# Patient Record
Sex: MALE | Race: WHITE
[De-identification: names, ages, dates, MRNs, and addresses within clinical notes are randomized per-mention and may not be internally consistent; named-entity substitution may affect disease eponyms.]

---

## 2019-02-05 ENCOUNTER — HOSPITAL ENCOUNTER (OUTPATIENT)
Dept: HOSPITAL 25 - OR | Age: 74
Discharge: HOME | End: 2019-02-05
Attending: PLASTIC SURGERY
Payer: MEDICARE

## 2019-02-05 VITALS — SYSTOLIC BLOOD PRESSURE: 134 MMHG | DIASTOLIC BLOOD PRESSURE: 76 MMHG

## 2019-02-05 DIAGNOSIS — Z85.828: ICD-10-CM

## 2019-02-05 DIAGNOSIS — Z85.46: ICD-10-CM

## 2019-02-05 DIAGNOSIS — C44.311: Primary | ICD-10-CM

## 2019-02-05 DIAGNOSIS — M19.90: ICD-10-CM

## 2019-02-05 PROCEDURE — 88332 PATH CONSLTJ SURG EA ADD BLK: CPT

## 2019-02-05 PROCEDURE — 88305 TISSUE EXAM BY PATHOLOGIST: CPT

## 2019-02-05 PROCEDURE — 88331 PATH CONSLTJ SURG 1 BLK 1SPC: CPT

## 2019-08-23 ENCOUNTER — HOSPITAL ENCOUNTER (INPATIENT)
Dept: HOSPITAL 25 - ED | Age: 74
LOS: 4 days | Discharge: NURSING, REHAB OR LONG-TERM CARE | DRG: 869 | End: 2019-08-27
Attending: HOSPITALIST | Admitting: HOSPITALIST
Payer: MEDICARE

## 2019-08-23 DIAGNOSIS — M62.81: ICD-10-CM

## 2019-08-23 DIAGNOSIS — Z83.3: ICD-10-CM

## 2019-08-23 DIAGNOSIS — M47.9: ICD-10-CM

## 2019-08-23 DIAGNOSIS — R25.3: ICD-10-CM

## 2019-08-23 DIAGNOSIS — M79.2: ICD-10-CM

## 2019-08-23 DIAGNOSIS — Z85.46: ICD-10-CM

## 2019-08-23 DIAGNOSIS — M19.042: ICD-10-CM

## 2019-08-23 DIAGNOSIS — A69.22: Primary | ICD-10-CM

## 2019-08-23 DIAGNOSIS — M51.37: ICD-10-CM

## 2019-08-23 DIAGNOSIS — Z72.89: ICD-10-CM

## 2019-08-23 DIAGNOSIS — Z85.828: ICD-10-CM

## 2019-08-23 DIAGNOSIS — Q76.2: ICD-10-CM

## 2019-08-23 DIAGNOSIS — Z80.0: ICD-10-CM

## 2019-08-23 DIAGNOSIS — K59.00: ICD-10-CM

## 2019-08-23 DIAGNOSIS — Z92.3: ICD-10-CM

## 2019-08-23 DIAGNOSIS — Z82.3: ICD-10-CM

## 2019-08-23 DIAGNOSIS — Z83.49: ICD-10-CM

## 2019-08-23 DIAGNOSIS — Z85.07: ICD-10-CM

## 2019-08-23 DIAGNOSIS — Z80.7: ICD-10-CM

## 2019-08-23 LAB
ALBUMIN SERPL BCG-MCNC: 4.1 G/DL (ref 3.2–5.2)
ALBUMIN/GLOB SERPL: 1.4 {RATIO} (ref 1–3)
ALP SERPL-CCNC: 61 U/L (ref 34–104)
ALT SERPL W P-5'-P-CCNC: 20 U/L (ref 7–52)
ANION GAP SERPL CALC-SCNC: 7 MMOL/L (ref 2–11)
AST SERPL-CCNC: 19 U/L (ref 13–39)
BASOPHILS # BLD AUTO: 0.1 10^3/UL (ref 0–0.2)
BUN SERPL-MCNC: 14 MG/DL (ref 6–24)
BUN/CREAT SERPL: 14.7 (ref 8–20)
CALCIUM SERPL-MCNC: 9.1 MG/DL (ref 8.6–10.3)
CHLORIDE SERPL-SCNC: 94 MMOL/L (ref 101–111)
CK SERPL-CCNC: 74 U/L (ref 10–223)
EOSINOPHIL # BLD AUTO: 0.1 10^3/UL (ref 0–0.6)
GLOBULIN SER CALC-MCNC: 2.9 G/DL (ref 2–4)
GLUCOSE SERPL-MCNC: 98 MG/DL (ref 70–100)
HCO3 SERPL-SCNC: 27 MMOL/L (ref 22–32)
HCT VFR BLD AUTO: 46 % (ref 42–52)
HGB BLD-MCNC: 15.8 G/DL (ref 14–18)
INR PPP/BLD: 1.03 (ref 0.82–1.09)
LYMPHOCYTES # BLD AUTO: 1.6 10^3/UL (ref 1–4.8)
MCH RBC QN AUTO: 31 PG (ref 27–31)
MCHC RBC AUTO-ENTMCNC: 34 G/DL (ref 31–36)
MCV RBC AUTO: 91 FL (ref 80–94)
MONOCYTES # BLD AUTO: 1 10^3/UL (ref 0–0.8)
NEUTROPHILS # BLD AUTO: 9.9 10^3/UL (ref 1.5–7.7)
NRBC # BLD AUTO: 0 10^3/UL
NRBC BLD QL AUTO: 0.1
PLATELET # BLD AUTO: 284 10^3/UL (ref 150–450)
POTASSIUM SERPL-SCNC: 3.9 MMOL/L (ref 3.5–5)
PROT SERPL-MCNC: 7 G/DL (ref 6.4–8.9)
RBC # BLD AUTO: 5.06 10^6 /UL (ref 4.18–5.48)
SODIUM SERPL-SCNC: 128 MMOL/L (ref 135–145)
TSH SERPL-ACNC: 0.82 MCIU/ML (ref 0.34–5.6)
WBC # BLD AUTO: 12.7 10^3/UL (ref 3.5–10.8)

## 2019-08-23 PROCEDURE — 85025 COMPLETE CBC W/AUTO DIFF WBC: CPT

## 2019-08-23 PROCEDURE — 95910 NRV CNDJ TEST 7-8 STUDIES: CPT

## 2019-08-23 PROCEDURE — 82607 VITAMIN B-12: CPT

## 2019-08-23 PROCEDURE — 86140 C-REACTIVE PROTEIN: CPT

## 2019-08-23 PROCEDURE — 82550 ASSAY OF CK (CPK): CPT

## 2019-08-23 PROCEDURE — 81003 URINALYSIS AUTO W/O SCOPE: CPT

## 2019-08-23 PROCEDURE — 83970 ASSAY OF PARATHORMONE: CPT

## 2019-08-23 PROCEDURE — 83519 RIA NONANTIBODY: CPT

## 2019-08-23 PROCEDURE — 86618 LYME DISEASE ANTIBODY: CPT

## 2019-08-23 PROCEDURE — 87070 CULTURE OTHR SPECIMN AEROBIC: CPT

## 2019-08-23 PROCEDURE — 86617 LYME DISEASE ANTIBODY: CPT

## 2019-08-23 PROCEDURE — 87205 SMEAR GRAM STAIN: CPT

## 2019-08-23 PROCEDURE — 84155 ASSAY OF PROTEIN SERUM: CPT

## 2019-08-23 PROCEDURE — 82784 ASSAY IGA/IGD/IGG/IGM EACH: CPT

## 2019-08-23 PROCEDURE — 83520 IMMUNOASSAY QUANT NOS NONAB: CPT

## 2019-08-23 PROCEDURE — 82525 ASSAY OF COPPER: CPT

## 2019-08-23 PROCEDURE — 36415 COLL VENOUS BLD VENIPUNCTURE: CPT

## 2019-08-23 PROCEDURE — 84165 PROTEIN E-PHORESIS SERUM: CPT

## 2019-08-23 PROCEDURE — 84157 ASSAY OF PROTEIN OTHER: CPT

## 2019-08-23 PROCEDURE — 82945 GLUCOSE OTHER FLUID: CPT

## 2019-08-23 PROCEDURE — 84425 ASSAY OF VITAMIN B-1: CPT

## 2019-08-23 PROCEDURE — 87389 HIV-1 AG W/HIV-1&-2 AB AG IA: CPT

## 2019-08-23 PROCEDURE — 88112 CYTOPATH CELL ENHANCE TECH: CPT

## 2019-08-23 PROCEDURE — 89051 BODY FLUID CELL COUNT: CPT

## 2019-08-23 PROCEDURE — 94150 VITAL CAPACITY TEST: CPT

## 2019-08-23 PROCEDURE — 86789 WEST NILE VIRUS ANTIBODY: CPT

## 2019-08-23 PROCEDURE — 86666 EHRLICHIA ANTIBODY: CPT

## 2019-08-23 PROCEDURE — 70553 MRI BRAIN STEM W/O & W/DYE: CPT

## 2019-08-23 PROCEDURE — 87529 HSV DNA AMP PROBE: CPT

## 2019-08-23 PROCEDURE — 85610 PROTHROMBIN TIME: CPT

## 2019-08-23 PROCEDURE — A9579 GAD-BASE MR CONTRAST NOS,1ML: HCPCS

## 2019-08-23 PROCEDURE — 86780 TREPONEMA PALLIDUM: CPT

## 2019-08-23 PROCEDURE — 72100 X-RAY EXAM L-S SPINE 2/3 VWS: CPT

## 2019-08-23 PROCEDURE — 72156 MRI NECK SPINE W/O & W/DYE: CPT

## 2019-08-23 PROCEDURE — 62270 DX LMBR SPI PNXR: CPT

## 2019-08-23 PROCEDURE — 86255 FLUORESCENT ANTIBODY SCREEN: CPT

## 2019-08-23 PROCEDURE — 80053 COMPREHEN METABOLIC PANEL: CPT

## 2019-08-23 PROCEDURE — 99284 EMERGENCY DEPT VISIT MOD MDM: CPT

## 2019-08-23 PROCEDURE — 83516 IMMUNOASSAY NONANTIBODY: CPT

## 2019-08-23 PROCEDURE — 80048 BASIC METABOLIC PNL TOTAL CA: CPT

## 2019-08-23 PROCEDURE — 86592 SYPHILIS TEST NON-TREP QUAL: CPT

## 2019-08-23 PROCEDURE — 84630 ASSAY OF ZINC: CPT

## 2019-08-23 PROCEDURE — 72158 MRI LUMBAR SPINE W/O & W/DYE: CPT

## 2019-08-23 PROCEDURE — 84443 ASSAY THYROID STIM HORMONE: CPT

## 2019-08-23 PROCEDURE — 83916 OLIGOCLONAL BANDS: CPT

## 2019-08-23 PROCEDURE — 95886 MUSC TEST DONE W/N TEST COMP: CPT

## 2019-08-23 PROCEDURE — 85652 RBC SED RATE AUTOMATED: CPT

## 2019-08-23 PROCEDURE — 83825 ASSAY OF MERCURY: CPT

## 2019-08-23 PROCEDURE — 86376 MICROSOMAL ANTIBODY EACH: CPT

## 2019-08-23 PROCEDURE — 87798 DETECT AGENT NOS DNA AMP: CPT

## 2019-08-23 PROCEDURE — 72157 MRI CHEST SPINE W/O & W/DYE: CPT

## 2019-08-23 PROCEDURE — 82300 ASSAY OF CADMIUM: CPT

## 2019-08-23 PROCEDURE — 72131 CT LUMBAR SPINE W/O DYE: CPT

## 2019-08-23 PROCEDURE — 82042 OTHER SOURCE ALBUMIN QUAN EA: CPT

## 2019-08-23 PROCEDURE — 82040 ASSAY OF SERUM ALBUMIN: CPT

## 2019-08-23 PROCEDURE — 82175 ASSAY OF ARSENIC: CPT

## 2019-08-23 PROCEDURE — 83655 ASSAY OF LEAD: CPT

## 2019-08-23 PROCEDURE — G0378 HOSPITAL OBSERVATION PER HR: HCPCS

## 2019-08-23 PROCEDURE — 95869 NDL EMG THRC PARASPINAL MUSC: CPT

## 2019-08-23 PROCEDURE — 86788 WEST NILE VIRUS AB IGM: CPT

## 2019-08-23 PROCEDURE — 86753 PROTOZOA ANTIBODY NOS: CPT

## 2019-08-23 PROCEDURE — 86256 FLUORESCENT ANTIBODY TITER: CPT

## 2019-08-23 PROCEDURE — 86038 ANTINUCLEAR ANTIBODIES: CPT

## 2019-08-23 RX ADMIN — HEPARIN SODIUM SCH UNITS: 5000 INJECTION INTRAVENOUS; SUBCUTANEOUS at 21:37

## 2019-08-23 RX ADMIN — THERA TABS SCH TAB: TAB at 21:35

## 2019-08-23 NOTE — XMS REPORT
Summary of Care

 Created on:2019



Patient:Anu Valdez

Sex:Male

:1945

External Reference #:[id number]





Demographics







 Address  [address] [place] [address]

 

 Home Phone  [phone]

 

 Work Phone  [phone]

 

 Mobile Phone  [phone]

 

 Email Address  [email or web address]

 

 Preferred Language  English

 

 Marital Status  Not  or 

 

 Scientology Affiliation  Unknown

 

 Race  White

 

 Ethnic Group  Not  or 









Author







 Organization  [facility]

 

 Address  [address] [address]









Support







 Name  Relationship  Address  Phone

 

 Joseline Valdez  Unavailable  Unavailable  [phone]

 

 Joseline Valdez  Unavailable  Unavailable  [phone]









Care Team Providers







 Name  Role  Phone

 

 Hazel Brito MD  Primary Care Provider  [phone]

 

 Karen Guallpa MD  Unavailable  [phone]









Reason for Referral

Refer to Department Only (Routine)





 Status  Reason  Specialty  Diagnoses /  Referred By  Referred To



       Procedures  Contact  Contact

 

 Authorized    NEUROSURGERY /  Diagnoses



Spondylolisthesis, lumbar region  Zohaib Sotelo,  



     Neurosurgery    MD



  



         [address] [place] [address]



  



         Phone:  



         [phone]



  



         Fax:  



         [phone]  









 Scheduling Instructions

 

 For Pituitary Masses: Refer to Endocinology and Ophthalmology for testing. 
Patients



 already having this testing should have an internal referral to Neurosurgery 
placed.







 







 For Suspected Normal Pressure Hydrocephalus: Refer to neurology for a dementia 
work



 up, have a large volume lumbar puncture (40 cc) performed. For incontinence, 
refer to



 Urology. Obtain Physical Therapy Gait evaluation before and after large volume 
lumbar



 puncture. Patients should have the above work ups performed prior to consulting



 Neurosurgery. 







 







 For Confirmed Normal Pressure Hydrocephalus: Consult Neurosurgery.







 







 









Durable Medical Equipment (Routine)





 Status  Reason  Specialty  Diagnoses /  Referred By  Referred To



       Procedures  Contact  Contact

 

 Pending Review      Diagnoses



Spondylolisthesis at L5-S1 level  Zohaib Sotelo MD



  



         [facility] [id number]



  



         Phone:  



         [phone]



  



         Fax: [phone]  





MRI/CAT/PET Scan (Routine)





 Status  Reason  Specialty  Diagnoses /  Referred By  Referred To



       Procedures  Contact  Contact

 

 Pending Review      Diagnoses



Spondylolisthesis, lumbar region  Zohaib Sotelo MD



  



       



Procedures



MR LUMBAR SPINE WO CONTRAST  [address] [facility] [address]



  



         [place] [address]



  



         Phone:  



         [phone]



  



         Fax: [phone]  









Reason for Visit







 Reason  Comments

 

 Follow Up  1 wk f/u low back pain. Patient here to discuss more options to 
resolve his



   severe low back pain.







Encounter Details







 Date  Type  Department  Care Team  Description

 

 2019  Office Visit  [facility]  Sotelo, Zohaib,  
Spondylolisthesis, lumbar region (Primary Dx);



     - Selene LANGLEY



  Acute hip pain, left;



     [facility]



  [address] [place]  Spondylolisthesis at L5-S1 level



     [address]



  DRIVE



  



     [place], [place] [address] [place] [address] [address]



  



     [phone]  [place] [address]



  



       [phone]  



       (Fax)  







Allergies

No Known Allergiesdocumented as of this encounter (statuses as of 2019)



Medications







 Medication  Sig  Dispensed  Refills  Start Date  End Date  Status

 

 ibuprofen (MOTRIN) 200  Take 400 mg by    0      Active



 MG Oral Tab  mouth EVERY SIX          



   HOURS AS NEEDED          



   for Pain.          

 

 Multiple  Take  by mouth    0      Active



 Vitamins-Minerals  DAILY.          



 (PRESERVISION AREDS            



 PO)            

 

 indomethacin (INDOCIN)  Take 1 Cap by  60 Cap  0  2019    Active



 50 MG Oral Cap  mouth THREE          



   TIMES DAILY.          

 

 predniSONE (DELTASONE)  3 po qd x 3 then  30 Tab  0  2019    Active



 20 MG Oral Tab  2 po qd x3 then          



   i po qd x3 then          



   1/2 po qd x4          

 

 OXYcodone-acetaminophe  Take 1 Tab by  28 Tab  0  2019  
Active



 n (PERCOCET) 5-325 MG  mouth EVERY SIX          



 Oral Tab  HOURS AS NEEDED          



   (back pain) for          



   up to 7 days.          



   Max Daily          



   Amount: 4 Tabs.          



documented as of this encounter (statuses as of 2019)



Active Problems







 Problem  Noted Date

 

 Right foot pain  2018

 

 Dilated aortic root  2017

 

 S/P rotator cuff repair  2016









 Overview: 



right









 Prostate cancer  2014









 Overview: 







 treated with radiation therapy, Dr. Cobb urologist, Dr Raman oncologist:  44 
low



 doses. NO side effects.









 BMI 26.0-26.9,adult  2013









 Overview: 



This patient's BMI has been calculated and is above average, and BMI management 
plan is completed.









 Colon polyp  









 Overview: 







 next c-scop 









 Aortic insufficiency  









 Overview: 







 moderate on routine echo in  while in [place]









 Prostate Cancer  









 Overview: 







 always 2-2.5, was 3.9 in , >9 in ; followed by [facility], 
watchful



 waiting as of , with MRI surveillance, Dr. Muñoz









 Seasonal allergies  

 

 Skin Cancer  









 Overview: 







 SCC in 1980s, BCC 2010 Dr. Costello



documented as of this encounter (statuses as of 2019)



Resolved Problems







 Problem  Noted Date  Resolved Date

 

 Shoulder pain, right  2015

 

 Back pain    2017









 Overview: 







 spina bifida occulta with spondylolisthesis



documented as of this encounter (statuses as of 2019)



Immunizations







 Name  Administration Dates  Next Due

 

 Influenza (IM) Preservative Free  10/01/2016, 2013  

 

 Influenza Vaccine High Dose  10/11/2018, 2017, 2016,  



   2014  

 

 PNEUMOCOCCAL POLYSACCHARIDE VACCINE  2009  

 

 Pneumococcal Conjugate(13 Valent)  2016  

 

 TDAP Vaccine  2016  



documented as of this encounter



Social History







 Tobacco Use  Types  Packs/Day  Years Used  Date

 

 Never Smoker        









 Smokeless Tobacco: Never Used      









 Alcohol Use  Drinks/Week  oz/Week  Comments

 

 Yes  5-7 Standard drinks or equivalent  4.2 - 5.8  









 Social Isolation  Answer  Date Recorded

 

 In a typical week, how many times do you  More than three times a week  2019



 talk on the phone with family, friends, or    



 neighbors?    

 

 How often do you get together with friends  More than three times a week  



 or relatives?    

 

 How often do you attend Zoroastrian or  Not asked  



 Buddhist services?    

 

 Do you belong to any clubs or  Not asked  



 organizations such as Zoroastrian groups,    



 unions, fraternal or athletic groups, or    



 school groups?    

 

 How often do you attend meetings of the  Not asked  



 clubs or organizations you belong to?    

 

 Are you now , , ,    2019



 , never  or living with a    



 partner?    









 Physical Activity  Answer  Date Recorded

 

 On average, how many days per week do you engage in moderate to  3 days  2019



 strenuous exercise (like walking fast, running, jogging,    



 dancing, swimming, biking, or other activities that cause a    



 light or heavy sweat)?    

 

 On average, how many minutes do you engage in exercise at this  60 min  2019



 level?    









 Stress  Answer  Date Recorded

 

 Do you feel stress - tense, restless, nervous, or anxious,  Not at all  2019



 or unable to sleep at night because your mind is troubled    



 all the time - these days?    









 Financial Resource Strain  Answer  Date Recorded

 

 How hard is it for you to pay for the very basics like  Not hard at all  2019



 food, housing, medical care, and heating?    









 Intimate Partner Violence  Answer  Date Recorded

 

 Within the last year, have you been afraid of your partner or  No  2019



 ex-partner?    

 

 Within the last year, have you been humiliated or emotionally  No  2019



 abused in other ways by your partner or ex-partner?    

 

 Within the last year, have you been kicked, hit, slapped, or  No  2019



 otherwise physically hurt by your partner or ex-partner?    

 

 Within the last year, have you been raped or forced to have any  No  2019



 kind of sexual activity by your partner or ex-partner?    









 Food Insecurity  Answer  Date Recorded

 

 Within the past 12 months, you worried that your food would  Never true  2019



 run out before you got money to buy more.    

 

 Within the past 12 months, the food you bought just didn't  Never true  2019



 last and you didn't have money to get more.    









 Transportation Needs  Answer  Date Recorded

 

 In the past 12 months, has lack of transportation kept you from  No  2019



 medical appointments or from getting medications?    

 

 In the past 12 months, has lack of transportation kept you from  No  2019



 meetings, work, or getting things needed for daily living?    









 Sex Assigned at Birth  Date Recorded

 

 Not on file  









 Job Start Date  Occupation  Industry

 

 Not on file  Not on file  Not on file









 Travel History  Travel Start  Travel End









 No recent travel history available.



documented as of this encounter



Last Filed Vital Signs







 Vital Sign  Reading  Time Taken  Comments

 

 Blood Pressure  155/93  2019  7:58 AM EDT  

 

 Pulse  73  2019  7:58 AM EDT  

 

 Temperature  -  -  

 

 Respiratory Rate  -  -  

 

 Oxygen Saturation  -  -  

 

 Inhaled Oxygen Concentration  -  -  

 

 Weight  84.8 kg (187 lb)  2019  7:58 AM EDT  

 

 Height  170.2 cm (5' 7")  2019  7:58 AM EDT  

 

 Body Mass Index  29.29  2019  7:58 AM EDT  



documented in this encounter



Progress Notes

Zohaib Sotelo MD - 2019  8:00 AM EDTFormatting of this note might be 
different from the original.

Name:  Anu Valdez

MRN:  [id number]

:  1945

Date of Service:  2019



Chief Complaint

Patient presents with

 Follow Up

  1 wk f/u low back pain. Patient here to discuss more options to resolve his 
severe low back pain.



No change with prednisone.  Still has severe left-sided low back pain with left 
anterior leg radiation and question of weakness of hip flexion.  Patient also 
has groin pain.  Review of lumbosacral x-rays from last week show significant 
hip degenerative arthritis.  Tylenol not working.  Patient previously had to 
avoid narcotic pain medicine but now, understandably, would like to try it.  He 
has been using a cane because he feels like his leg may give way.  No bowel or 
bladder symptoms.  No saddle anesthesia.  No sciatic symptoms such as posterior 
leg pain.  Patient has history of prostate cancer which has been followed by 
sequential PSAs and thus far there has been no evidence of recurrence.  Patient 
was not able to serve the  due to the spondylolisthesis which was seen 
at that time.  He did not have any symptoms at that time.



Physical exam shows a healthy-appearing man who appears quite uncomfortable due 
to back and leg pain.  Alert and oriented.  Low back nontender.  Hip range of 
motion causes groin pain on internal rotation.  Straight leg raising negative.  
Patient unable to actively flex hip completely though he does have some limited 
flexion.  Grossly no focal motor or sensory abnormalities other than above.



Lumbosacral spine flexion extension views show severe spondylolisthesis.  I do 
not see any motion for certain but it is difficult to tell for sure.  X-rays 
pelvis and hip show severe left hip osteoarthritis.



Impression: Patient appears to have combination of lumbar and primary hip 
arthritis pathology.



Plan: MRI to assess for acute disc versus stenosis versus possibility of 
prostate mets though less likely in light of PSAs and symptoms.  Percocet 
ordered.  Walker ordered.  Neurosurgical consult orderedto be done after 
MRI complete.







  ICD-9-CM ICD-10-CM

1. Spondylolisthesis, lumbar region 738.4 M43.16 MR LUMBAR SPINE WO CONTRAST



Author:  Zohaib Sotelo MD  2019  08:16



This record contains sections created with voice recognition software.  It has 
been electronically signed.  A reasonable attempt at proofreading has been 
made.  Please call with any questions or corrections.

Electronically signed by Zohaib Sotelo MD at 2019  8:43 AM 
EDTdocumented in this encounter



Plan of Treatment







 Date  Type  Specialty  Care Team  Description

 

 2020  Chronic Care Management  Family Practice    









 Name  Type  Priority  Associated Diagnoses  Date/Time

 

 XR HIP 2 VIEWS UNILAT  Imaging  Routine  Spondylolisthesis,  2019  8:33 
AM EDT



 W/PELVIS LEFT      lumbar region



  



       Acute hip pain, left  

 

 XR LUMBAR SPINE  Imaging  Routine  Spondylolisthesis,  2019  8:34 AM EDT



 BENDING VIEWS ONLY      lumbar region  



 MIN 2-3 VIEWS        









 Name  Type  Priority  Associated Diagnoses  Order Schedule

 

 MR LUMBAR SPINE WO  Imaging  Routine  Spondylolisthesis,  Expected: 2019,



 CONTRAST      lumbar region  Expires: 2020

 

 XR HIP 2 VIEWS UNILAT  Imaging  Routine  Spondylolisthesis,  Expected: 2019,



 W/PELVIS LEFT      lumbar region



  Expires: 2020



       Acute hip pain, left  

 

 XR LUMBAR SPINE  Imaging  Routine  Spondylolisthesis,  Expected: 2019,



 BENDING VIEWS ONLY MIN      lumbar region  Expires: 2020



 2-3 VIEWS        









 Name  Type  Priority  Associated Diagnoses  Order Schedule

 

 DME WALKER (AMB)  Referral  Routine  Spondylolisthesis at L5-S1  Ordered:



       level  2019

 

 REFER TO NEUROSURGERY  Referral  Routine  Spondylolisthesis, lumbar  Expected:



       region  2019,



         Expires:



         2020









 Health Maintenance  Due Date  Last Done  Comments

 

 ZOSTER IMMUNIZATION SERIES  1993    



 (1 of 2)      

 

 INFLUENZA VACCINE (#1)  2019  10/11/2018, 2017,  



     10/01/2016, Additional  



     history exists  

 

 MEDICARE ANNUAL WELLNESS  2020, 2016,  



 VISIT    2013, Additional  



     history exists  

 

 DEPRESSION SCREENING  2020  

 

 FALL RISK ASSESSMENT  2020, 2019  

 

 COLONOSCOPY SCREENING  02/15/2023  02/15/2018, 10/05/2015,  



     08/10/2009, Additional  



     history exists  

 

 PNEUMOCOCCAL 65+YRS  Completed  2016, 2009  

 

 HPV IMMUNIZATION SERIES  Aged Out    No longer eligible



       based on patient's age



       to complete this topic

 

 MENINGOCOCCAL VACCINE IMM  Aged Out    No longer eligible



       based on patient's age



       to complete this topic



documented as of this encounter



Implants







 Implanted  Type  Area    Device  Shelf  Model /



         Identifier  Expiration  Serial /



           Date  Lot

 

 5.5 Corks[place]w [place] [id number]    Right:  [employer]      [id number] /



 Implanted: Qty: 1 on 2015 by Sam Rivera MD at [facility] 
   Shoulder         /



             [id number]

 

 Pushlock  3.5mm - [id number]    Right:  AR[employer]REX      [id number] /



 Implanted: Qty: 2 on 2015 by Sam Rivera MD at [facility] 
   Shoulder         /



             [id number]

 

 4.75 Biocomposite Swivel Lock    Right:  ARTHREX    2017  [id number] /



 Implanted: Qty: 1 on 2015 by Sam Rivera MD at [facility] 
   Shoulder         /



             [id number]



documented as of this encounter



Results

Not on filedocumented in this encounter



Visit Diagnoses







 Diagnosis

 

 Spondylolisthesis, lumbar region - Primary

 

 Acute hip pain, left

 

 Spondylolisthesis at L5-S1 level







 Congenital spondylolisthesis



documented in this encounter



Insurance







 Payer  Benefit Plan / Group  Subscriber ID  Effective Dates  Phone  Address  
Type

 

 MEDICARE  MEDICARE PART A & B  xxxxxxxxxxx  2008-Present      Medicare









 Guarantor Name  Account Type  Relation to  Date of Birth  Phone  Billing



     Patient      Address

 

 Anu Valdez  Personal/Family    1945  [phone]  [address] [place] [address]



         (Home)



  [address]







         [phone]  [place], [place]



         (Work)  [id number]



documented as of this encounter

## 2019-08-23 NOTE — XMS REPORT
Summary of Care

 Created on:2019



Patient:Anu Valdez

Sex:Male

:1945

External Reference #:[id number]





Demographics







 Address  [address] [place] [address]

 

 Home Phone  [phone]

 

 Work Phone  [phone]

 

 Mobile Phone  [phone]

 

 Email Address  [email or web address]

 

 Preferred Language  English

 

 Marital Status  Not  or 

 

 Jain Affiliation  Unknown

 

 Race  White

 

 Ethnic Group  Not  or 









Author







 Organization  [facility]

 

 Address  [address] [place] [address]









Support







 Name  Relationship  Address  Phone

 

 Joseline Valdez  Unavailable  Unavailable  [phone]

 

 Joseline Valdez  Unavailable  Unavailable  [phone]









Care Team Providers







 Name  Role  Phone

 

 Hazel Brito MD  Primary Care Provider  [phone]

 

 Karen Guallpa MD  Unavailable  [phone]









Reason for Visit







 Reason  Comments

 

 Back Pain  x 3 weeks, lower back







Encounter Details







 Date  Type  Department  Care Team  Description

 

 2019  Office Visit  Selene Brito,  Weakness of back (
Primary Dx);



     Practice



  Hazel CODY MD



  Breathing problem;



     [address]



  [address] [facility]



  Acute left-sided low back pain with left-sided sciatica



     [place] [address]



  



     [phone] (Fax)  







Allergies

No Known Allergiesdocumented as of this encounter (statuses as of 2019)



Medications







 Medication  Sig  Dispensed  Refills  Start Date  End Date  Status

 

 ibuprofen (MOTRIN)  Take 400 mg    0      Active



 200 MG Oral Tab  by mouth          



   EVERY SIX          



   HOURS AS          



   NEEDED for          



   Pain.          

 

 Multiple  Take  by    0      Active



 Vitamins-Minerals  mouth DAILY.          



 (PRESERVISION            



 AREDS PO)            

 

 indomethacin  Take 1 Cap  60 Cap  0  2019  Discontinued



 (INDOCIN) 50 MG  by mouth        9  (Patient stopped



 Oral Cap  THREE TIMES          the medication)



   DAILY.          

 

 predniSONE  3 po qd x 3  30 Tab  0  2019  Discontinued



 (DELTASONE) 20 MG  then 2 po qd        9  (Therapy



 Oral Tab  x3 then i po          Completed)



   qd x3 then          



   1/2 po qd x4          



documented as of this encounter (statuses as of 2019)



Active Problems







 Problem  Noted Date

 

 Right foot pain  2018

 

 Dilated aortic root  2017

 

 S/P rotator cuff repair  2016









 Overview: 



right









 Prostate cancer  2014









 Overview: 







 treated with radiation therapy, Dr. Cobb urologist, Dr Raman oncologist:  44 
low



 doses. NO side effects.









 BMI 26.0-26.9,adult  2013









 Overview: 



This patient's BMI has been calculated and is above average, and BMI management 
plan is completed.









 Colon polyp  









 Overview: 







 next c-scop 









 Aortic insufficiency  









 Overview: 







 moderate on routine echo in  while in [place]









 Prostate Cancer  









 Overview: 







 always 2-2.5, was 3.9 in , >9 in ; followed by [place] urology, 
watchful



 waiting as of , with MRI surveillance, Dr. Muñoz









 Seasonal allergies  

 

 Skin Cancer  









 Overview: 







 SCC in , BCC 2010 Dr. Costello



documented as of this encounter (statuses as of 2019)



Resolved Problems







 Problem  Noted Date  Resolved Date

 

 Shoulder pain, right  2015

 

 Back pain    2017









 Overview: 







 spina bifida occulta with spondylolisthesis



documented as of this encounter (statuses as of 2019)



Immunizations







 Name  Administration Dates  Next Due

 

 Influenza (IM) Preservative Free  10/01/2016, 2013  

 

 Influenza Vaccine High Dose  10/11/2018, 2017, 2016,  



   2014  

 

 PNEUMOCOCCAL POLYSACCHARIDE VACCINE  2009  

 

 Pneumococcal Conjugate(13 Valent)  2016  

 

 TDAP Vaccine  2016  



documented as of this encounter



Social History







 Tobacco Use  Types  Packs/Day  Years Used  Date

 

 Never Smoker        









 Smokeless Tobacco: Never Used      









 Alcohol Use  Drinks/Week  oz/Week  Comments

 

 Yes  5-7 Standard drinks or equivalent  4.2 - 5.8  









 Social Isolation  Answer  Date Recorded

 

 In a typical week, how many times do you  More than three times a week  2019



 talk on the phone with family, friends, or    



 neighbors?    

 

 How often do you get together with friends  More than three times a week  



 or relatives?    

 

 How often do you attend Sikhism or  Not asked  



 Episcopal services?    

 

 Do you belong to any clubs or  Not asked  



 organizations such as Sikhism groups,    



 unions, fraternal or athletic groups, or    



 school groups?    

 

 How often do you attend meetings of the  Not asked  



 clubs or organizations you belong to?    

 

 Are you now , , ,    2019



 , never  or living with a    



 partner?    









 Physical Activity  Answer  Date Recorded

 

 On average, how many days per week do you engage in moderate to  3 days  2019



 strenuous exercise (like walking fast, running, jogging,    



 dancing, swimming, biking, or other activities that cause a    



 light or heavy sweat)?    

 

 On average, how many minutes do you engage in exercise at this  60 min  2019



 level?    









 Stress  Answer  Date Recorded

 

 Do you feel stress - tense, restless, nervous, or anxious,  Not at all  2019



 or unable to sleep at night because your mind is troubled    



 all the time - these days?    









 Financial Resource Strain  Answer  Date Recorded

 

 How hard is it for you to pay for the very basics like  Not hard at all  2019



 food, housing, medical care, and heating?    









 Intimate Partner Violence  Answer  Date Recorded

 

 Within the last year, have you been afraid of your partner or  No  2019



 ex-partner?    

 

 Within the last year, have you been humiliated or emotionally  No  2019



 abused in other ways by your partner or ex-partner?    

 

 Within the last year, have you been kicked, hit, slapped, or  No  2019



 otherwise physically hurt by your partner or ex-partner?    

 

 Within the last year, have you been raped or forced to have any  No  2019



 kind of sexual activity by your partner or ex-partner?    









 Food Insecurity  Answer  Date Recorded

 

 Within the past 12 months, you worried that your food would  Never true  2019



 run out before you got money to buy more.    

 

 Within the past 12 months, the food you bought just didn't  Never true  2019



 last and you didn't have money to get more.    









 Transportation Needs  Answer  Date Recorded

 

 In the past 12 months, has lack of transportation kept you from  No  2019



 medical appointments or from getting medications?    

 

 In the past 12 months, has lack of transportation kept you from  No  2019



 meetings, work, or getting things needed for daily living?    









 Sex Assigned at Birth  Date Recorded

 

 Not on file  









 Job Start Date  Occupation  Industry

 

 Not on file  Not on file  Not on file









 Travel History  Travel Start  Travel End









 No recent travel history available.



documented as of this encounter



Last Filed Vital Signs







 Vital Sign  Reading  Time Taken  Comments

 

 Blood Pressure  112/86  2019  9:07 AM EDT  

 

 Pulse  87  2019  9:07 AM EDT  

 

 Temperature  -  -  

 

 Respiratory Rate  -  -  

 

 Oxygen Saturation  99%  2019  9:07 AM EDT  

 

 Inhaled Oxygen Concentration  -  -  

 

 Weight  84.8 kg (187 lb)  2019  9:07 AM EDT  

 

 Height  170.2 cm (5' 7")  2019  9:07 AM EDT  

 

 Body Mass Index  29.29  2019  9:07 AM EDT  



documented in this encounter



Patient Instructions

Patient InstructionsHazel Brito MD - 2019  9:00 AM EDTYour 
difficulty coughing and taking a deep breath, abdominal weakness are not from 
your Lumbar spine.

I worry something else is going on.

I worry about a progressive neurologic condition given your inability to lift 
your left leg, weak muscles causing you to fall when  You go to lie down



Please go to the ER now for evaluation.

I want to protect your breathing.Electronically signed by Hazel Brito MD at 2019  9:51 AM EDT

documented in this encounter



Progress Notes

Hazel Brito MD - 2019  9:00 AM EDTFormatting of this note 
might be different from the original.

Nursing Notes:

Essence De León LPN  2019  9:28 AM  Signed

Chief Complaint

Patient presents with

 Back Pain

  x 3 weeks, lower back



Chief Complaint:

Anu Valdez is a 74-y.o. male who presents for back pain for which he is 
seeing Dr Sotelo and Dr Dawson



History of Present Illness/ROS:

Patient twisted at the gym, felt mid back pain.

After a long drive, developed pain down left leg.

He saw Dr Sotelo who ordered an MRI.



Patient has been seeing orthopedics and neurosurgery for back pain

He sent me a message stating he had abdominal and leg weakness, and prior had 
constipation.

I had him see me in an office visit today as a  Result.



 Dr Dawson viewed it and reports:  fusion between L5 and S1 at the side of 
the slip.  I do not appreciate spinal cord or nerve root compression at other 
levels.



Dr Sotelo reports moderate arthritis if hips, as well as back.

Treatment:  Prednisone, then oxycodone which caused constipation.

Stools are now loose, improved with Miralax, and prunes.



Patient has left thigh spasm so leg give up.

He has fallen:  Reports he has weak abdominal muscles that just do not work, 
and has problems with cough.  Cannot produce a cough or take a deep breath.

No  Pain, just not strong enough.

He came in a wheelchair,

He is walking with a walker but has fall

Left leg, thigh, lateral left abdomen,. Left back feel numb, left leg is weak 
in thigh, but not lower leg.



He started Physical Therapy and they feel he has an L4 issue.

However things have changed in the last few days.



Review of Systems - General ROS: positive for  - general weaknss

negative for - chills or fatigue

Respiratory ROS: has difficulty coughing and taking a deep breath

Cardiovascular ROS: negative for - chest pain

Gastrointestinal ROS: positive for - change in bowel habits

Musculoskeletal ROS: low back pain and left leg pain

Neurological ROS: weakness of abdomen/thorax, left leg



XR LUMBAR SPINE BENDING VIEWS ONLY MIN 2-3 VIEWS

Narrative: Procedure(s): XR LUMBAR SPINE BENDING VIEWS ONLY MIN 2-3 VIEWS

Date of service: 2019 8:21 AM



Provided clinical information: 74 years, Male, "Spondylolisthesis

L5-S1"

Procedure and materials: Standard protocol.

Comparison studies: Lumbar spine 2019

Observations:

Lateral flexion, neutral and extension views demonstrate marked

anterior listhesis of L5 unchanged from prior study 2019.



No exaggerated vertebral mobility on lateral flexion-extension views.

Impression: No exaggerated vertebral mobility with attention to the L5-S1 
region.

Urgency: Routine. This is a routine medical imaging report.

Recommendation: No specific imaging recommendation.



Signed by Ricky Ferreira MD  on 2019 11:28 AM



Past Medical History:

Diagnosis Date

 Aortic insufficiency

 moderate on routine echo in  while in [place]

 Back pain

 spina bifida occulta with spondylolisthesis

 BCC (basal cell carcinoma of skin)

 forehead, Dr. Costello 2012, complete excision

 Colon polyp

 tubular adenoma transvers colon 

 Elevated PSA

 always 2-2.5, was 3.9 in 

 Hemorrhoids

 Hx of cardiovascular stress test 2016

 negative for ischemia.  Mildly dilated aortic root with mild aortic 
regurgitation

 Prostate cancer (HCC) 

 treated with radiation therapy, Dr. Cobb urologist, Dr Raman oncologist:  44 
low doses. NO side effects.

 Seasonal allergies

 Skin Cancer

 SCC in , BCC 2010 Dr. Costello

 VHD (valvular heart disease)

 aortic insufficiency



Past Surgical History:

Procedure Laterality Date

 ARTHROSCOPY, SHOULDER  ACROMIOPLASTY, DISTAL CLAVICLE Right 2015

 Procedure: ARTHROSCOPY SHOULDER ACROMIOPLASTY ROTATOR CUFF REPAIR RIGHT;  
Surgeon: Sam Rivera MD;  Location: [facility] MAIN OR

 DESTRUCT OF SKIN LESION

 HEMORRHOIDECTOMY  

 in [facility]

 SHOULDER ARTHROSCOPY Right 12/18/15

 RCR



Current Outpatient Medications:

  ibuprofen (MOTRIN) 200 MG Oral Tab, Take 400 mg by mouth EVERY SIX 
HOURS AS NEEDED for Pain., Disp: , Rfl:

  Multiple Vitamins-Minerals (PRESERVISION AREDS PO), Take  by mouth 
DAILY., Disp: , Rfl:



No Known Allergies



Social History



Socioeconomic History

 Marital status: 

  Spouse name: joseline

 Number of children: 3

 Years of education: Not on file

 Highest education level: Not on file

Occupational History

 Not on file

Social Needs

 Financial resource strain: Not hard at all

 Food insecurity:

  Worry: Never true

  Inability: Never true

 Transportation needs:

  Medical: No

  Non-medical: No

Tobacco Use

 Smoking status: Never Smoker

 Smokeless tobacco: Never Used

Substance and Sexual Activity

 Alcohol use: Yes

  Alcohol/week: 4.2 - 5.8 standard drinks

  Types: 5 - 7 Standard drinks or equivalent per week

 Drug use: No

 Sexual activity: Yes

  Partners: Female

  Comment: 

Lifestyle

 Physical activity:

  Days per week: 3 days

  Minutes per session: 60 min

 Stress: Not at all

Relationships

 Social connections:

  Talks on phone: More than three times a week

  Gets together: More than three times a week

  Attends Episcopal service: Not on file

  Active member of club or organization: Not on file

  Attends meetings of clubs or organizations: Not on file

  Relationship status: 

 Intimate partner violence:

  Fear of current or ex partner: No

  Emotionally abused: No

  Physically abused: No

  Forced sexual activity: No

Other Topics Concern

 Back Care Not Asked

 Bike Helmet Not Asked

 Blood Transfusions Not Asked

 Caffeine Concern Not Asked

 Exercise Yes

  Comment: walking daily with wife

 Hobby Hazards Yes

  Comment: wine making, 

 International Travel Not Asked

  Service No

 Occupational Exposure Not Asked

 Seat Belt Not Asked

 Self-Exams Not Asked

 Sleep Concern No

 Special Diet No

 Stress Concern No

 Weight Concern Yes

  Comment: max weight = 189

Social History Narrative

 Semi-retired from [employer].

 Lives with wife in [place].

 3 daughters.

 He travels overseas for travel consultation about 3 times a year.



Family History

Problem Relation Age of Onset

 Macular Degeneration Mother

 Colon Cancer Mother 55

 Stroke Mother

 Hypertension Mother

 Blood Disease Father

     Polycythemia Vera

 No Known Problems Sister

 Heart Daughter

     atrial fibrillation

 No Known Problems Daughter

 No Known Problems Daughter

 Cancer Paternal Grandfather 78

     colon cancer,  in surgery

 Diabetes Maternal Grandfather



PHYSICAL EXAMINATION:

/86 (BP Location: Right arm, Patient Position: Sitting)  | Pulse 87  | Ht 
5' 7" (1.702 m)  | Wt 187 lb (84.8 kg)  | SpO2 99%  | BMI 29.29 kg/m

Physical Examination:

General appearance - alert, well appearing, appears weak but not indistress.

Mental status - alert, oriented to person, place, and time, normal mood, 
behavior, speech, dress, motor activity, and thought processes

Eyes - pupils equal and reactive, extraocular eye movements grossly intact, 
sclera anicteric

Neck - supple, no cervical or supraclavicular adenopathy, carotids upstroke 
normal bilaterally, no bruits, thyroid exam: thyroid is normal in size without 
nodules or tenderness, no neck masses palpated.

Chest/Lungs - clear to auscultation, no wheezes, rales or rhonchi, symmetric 
air entry, moderate aeration

Heart - normal rate, regular rhythm, normal S1, S2, no murmurs, rubs, clicks or 
gallops

Abdomen - soft, non tender on palpation, nondistended, no masses or 
hepatosplenomegaly, bowel soundsnormal, normal to percussion, no guarding or 
rebound.

No costervertebral angle tenderness

Neurological - alert, oriented, normal speech, he came in a wheelchair an 
needed assistance standingand transferring to the exam table.  Motor is 1-2/5 
left thigh, 3-4/5 left foot, 4/5 RLE, 4-5/5 bilateral UE.  Sensation to soft 
touch, temperature, and vibration are intact in lower extremities and upper 
extremities.

Sensation of abdomen is intact.

DTR 0-1/2 bilateral achilles, patellar tendons, 1/2 bilateral arms.

Babinski is up going left foot, down going right.

He cannot lift his left leg while lying.  He needs assistance to sit up, not 
from pain but from weakness in his thorax.  His cough is weak and he slightly 
chokes on his saliva.  He has difficulty taking a deep breath.

Extremities - dorsalis pedis pulses normal, no pedal edema, no clubbing or 
cyanosis



MRI at [facility] 8/15/19: L4 disc bulge with no stenosis

L5-S1 moderate spinal stenosis and foraminal stenosis

Possible lesion left sacral ala.



XR LUMBAR SPINE BENDING VIEWS ONLY MIN 2-3 VIEWS

Narrative: Procedure(s): XR LUMBAR SPINE BENDING VIEWS ONLY MIN 2-3 VIEWS

Date of service: 2019 8:21 AM



Provided clinical information: 74 years, Male, "Spondylolisthesis

L5-S1"

Procedure and materials: Standard protocol.

Comparison studies: Lumbar spine 2019

Observations:

Lateral flexion, neutral and extension views demonstrate marked

anterior listhesis of L5 unchanged from prior study 2019.



No exaggerated vertebral mobility on lateral flexion-extension views.

Impression: No exaggerated vertebral mobility with attention to the L5-S1 
region.

Urgency: Routine. This is a routine medical imaging report.

Recommendation: No specific imaging recommendation.



Signed by Ricky Ferreira MD  on 2019 11:28 AM



Office Visit on 2019

Component Date Value Ref Range Status

 WBC Count 2019 7.84  4.23 - 9.07 K/uL Final

 RBC Count 2019 4.81  4.30 - 5.89 M/UL Final

 Hemoglobin 2019 14.3  13.7 - 17.5 G/DL Final

 Hematocrit 2019 44.0  40.1 - 51.0 % Final

 MCV 2019 91.5  79.0 - 92.2 FL Final

 MCH 2019 29.7  25.7 - 32.2 PG Final

 MCHC 2019 32.5  32.3 - 36.5 g/dL Final

 Platelet Count 2019 225  163 - 337 K/uL Final

 MPV 2019 11.1  9.4 - 12.4 FL Final

 RDW 2019 13.5  11.6 - 14.4 % Final

 Neutrophil % 2019 64.0  34.0 - 67.9 % Final

 Lymphocyte % 2019 22.3  21.8 - 53.1 % Final

 Monocyte % 2019 10.8  5.3 - 12.2 % Final

 Eosinophil % 2019 2.0  0.8 - 7.0 % Final

 Basophil % 2019 0.6  0.2 - 1.2 % Final

 nRBC % 2019 0.0  0.0 - 0.2 % Final

 Neutrophil # 2019 5.01  1.78 - 5.38 K/UL Final

 Lymphocyte # 2019 1.75  1.32 - 3.57 K/UL Final

 Monocyte # 2019 0.85* 0.30 - 0.82 K/UL Final

 Eosinophil # 2019 0.16  0.04 - 0.54 K/UL Final

 Basophil # 2019 0.05  0.01 - 0.08 K/UL Final

 Immature Gran % 2019 0.3  0.0 - 0.4 % Final

 Immature Gran # 2019 0.02  0.00 - 0.03 K/uL Final

 NRBC # 2019 0.00  0.00 - 0.12 K/uL Final

 Uric Acid 2019 6.6  3.5 - 8.5 MG/DL Final

 C-Reactive Protein 2019 4.70* &lt;1.00 mg/dl Final



Lab Results

Component Value Date

  2019

 K 4.2 2019

  2019

 CO2 29 2019

 GLUCOSE 97 2019

 BUN 17 2019

 CREATININE 1.0 2019

 CALCIUM 8.9 2019

 TP 7.1 2019

 ALBUMIN 3.9 2019

 AST 29 2019

 ALT 26 2019

 ALK 76 2019

 TBILI 0.7 2019

 EGFR &gt;60 2019





ASSESSMENT/PLAN:

  ICD-9-CM ICD-10-CM

1. Weakness of back 724.8 R29.898

2. Breathing problem 786.00 R06.9

3. Acute left-sided low back pain with left-sided sciatica 724.2 M54.42

 724.3



Patient Instructions

Your difficulty coughing and taking a deep breath, abdominal weakness are not 
from your Lumbar spine.

I worry something else is going on.

I worry about a progressive neurologic condition given your inability to lift 
your left leg, weak muscles causing you to fall when  You go to lie down



Please go to the ER now for evaluation.

I want to protect your breathing.



[facility] ER notified of my concerns, spoke with triage nurse Arianna.



Author:  Hazel Brito MD 2019 09:59



Answers for HPI/ROS submitted by the patient on 2019

Back pain

Chronicity: recurrent

Onset: 1 to 4 weeks ago

Frequency: constantly

Progression since onset: gradually worsening

Pain location: lumbar spine

Pain quality: aching

Radiates to: left knee, left thigh

Pain - numeric: 6/10

Pain is: worse during the night

Aggravated by: bending, position, lying down, standing

Stiffness is present: all day

abdominal pain: Yes

bladder incontinence: No

bowel incontinence: Yes

chest pain: No

dysuria: No

fever: No

headaches: No

leg pain: Yes

paresis: Yes

paresthesias: No

pelvic pain: Yes

perianal numbness: No

tingling: No

weakness: Yes

weight loss: Yes

Risk factors: history of cancer, obesity, poor posture, recent trauma

Electronically signed by Hazel Brito MD at 2019 10:08 AM 
EDTdocumented in this encounter



Plan of Treatment







 Date  Type  Specialty  Care Team  Description

 

 2019  Appointment  Neurodiagnostic    

 

 2019  Appointment  Radiology    

 

 2019  Office Visit  Neurosurgery  Ricky Dawson MD



  



       [address] [place] [address]



  



       [phone] (Fax)  

 

 2020  Chronic Care Management  Family Practice    









 Health Maintenance  Due Date  Last Done  Comments

 

 ZOSTER IMMUNIZATION SERIES  1993    



 (1 of 2)      

 

 INFLUENZA VACCINE (#1)  2019  10/11/2018, 2017,  



     2017, Additional  



     history exists  

 

 MEDICARE ANNUAL WELLNESS  2020, 2016,  



 VISIT    2013, Additional  



     history exists  

 

 DEPRESSION SCREENING  2020  

 

 FALL RISK ASSESSMENT  2020, 2019  

 

 COLONOSCOPY SCREENING  02/15/2023  02/15/2018, 10/05/2015,  



     08/10/2009, Additional  



     history exists  

 

 PNEUMOCOCCAL 65+YRS  Completed  2016, 2009  

 

 HPV IMMUNIZATION SERIES  Aged Out    No longer eligible



       based on patient's age



       to complete this topic

 

 MENINGOCOCCAL VACCINE IMM  Aged Out    No longer eligible



       based on patient's age



       to complete this topic



documented as of this encounter



Implants







 Implanted  Type  Area    Device  Shelf  Model /



         Identifier  Expiration  Serial /



           Date  Lot

 

 5.5 Corkscrew [place] [id number]    Right:  [employer]      [id number] /



 Implanted: Qty: 1 on 2015 by Sam Rivera MD at [facility] 
   Shoulder         /



             [id number]

 

 Pushlock  3.5mm - [id number]    Right:  AR[employer]      [id number] /



 Implanted: Qty: 2 on 2015 by Sam Rivera MD at [facility]         /



             [id number]

 

 4.75 Biocomposite Swivel Lock    Right:  ARTHREX    2017  [id number] /



 Implanted: Qty: 1 on 2015 by Sam Rivera MD at [facility]         /



             [id number]



documented as of this encounter



Results

Not on filedocumented in this encounter



Visit Diagnoses







 Diagnosis

 

 Weakness of back - Primary







 Other unspecified back disorder

 

 Breathing problem







 Respiratory abnormality, unspecified

 

 Acute left-sided low back pain with left-sided sciatica



documented in this encounter



Insurance







 Payer  Benefit Plan / Group  Subscriber ID  Effective Dates  Phone  Address  
Type

 

 MEDICARE  MEDICARE PART A & B  xxxxxxxxxxx  2008-Present      Medicare









 Guarantor Name  Account Type  Relation to  Date of Birth  Phone  Billing



     Patient      Address

 

 Anu Valdez  Personal/Family    1945  [phone]  [address] [place] [address]



         (Home)



  [address]







         [phone]  [place], [place]



         (Work)  [id number]



documented as of this encounter

## 2019-08-23 NOTE — ED
Lower Extremity





- HPI Summary


HPI Summary: 





Pt is a 73 y/o M presenting to the ED with a chief complaint of L leg weakness. 

He states he injured his back at the gym on 7/31 to try and strengthen his back 

muscles due to an L5/S1 slip, and ultimately had an MRI done for his sx. Per 

the provider at [facility], the MRI did not show a spinal issue, but it showed a L 

hip issue. He reports decreased ability to walk, decreased ROM in LLE, pain in 

L thigh described as cramping, weakness, occasional numbness in the sole of his 

L heel, inability to cough, and drifting to one side when sitting up. He 

denies tingling, incontinence, or other urinary sx. Pt states he can walk with 

walker.


Medications reviewed. Allergies noted.





- History of Current Complaint


Chief Complaint: EDGeneral


Stated Complaint: UNABLE TO COUGH PER PT


Time Seen by Provider: 08/23/19 11:05


Hx Obtained From: Patient


Mechanism Of Injury: Other - exercising


Onset of Pain: Immediate


Onset/Duration: Still Present


Severity Initially: Mild


Severity Currently: Mild


Pain Intensity: 3


Pain Scale Used: 0-10 Numeric


Timing: Constant, Lasting Weeks


Location: Is Discrete @ - LLE


Associated Signs And Symptoms: Positive: Weakness, Other - L thigh pain


Aggravating Factor(s): Other - sitting up


Alleviating Factor(s): Rest


Able to Bear Weight: Yes - with walker





- Allergies/Home Medications


Allergies/Adverse Reactions: 


 Allergies











Allergy/AdvReac Type Severity Reaction Status Date / Time


 


No Known Allergies Allergy   Verified 08/15/19 11:53














PMH/Surg Hx/FS Hx/Imm Hx


Previously Healthy: Yes


Endocrine/Hematology History: 


   Denies: Hx Diabetes


Cardiovascular History: 


   Denies: Hx Hypertension, Hx Pacemaker/ICD


 History: 


   Denies: Hx Dialysis, Hx Renal Disease


Musculoskeletal History: Reports: Hx Arthritis - LEFT HAND-RING FINGER, Other 

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 

THE [place]


Sensory History: Reports: Hx Contacts or Glasses - GLASSES


   Denies: Hx Hearing Aid


Opthamlomology History: Reports: Hx Contacts or Glasses - GLASSES


Psychiatric History: 


   Denies: Hx Panic Disorder





- Cancer History


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


Hx Chemotherapy: No


Hx Radiation Therapy: Yes





- Surgical History


Surgery Procedure, Year, and Place: HEMORROIDECTOMY.  PROSTATE  - CANCER - 

RADIATION - TREAM[place].  Rt RTC REPAIR


Hx Anesthesia Reactions: No


Infectious Disease History: No


Infectious Disease History: 


   Denies: Traveled Outside the [place] in Last 30 Days





- Social History


Alcohol Use: Daily


Alcohol Amount: GLASS OF WINE DAILY


Hx Substance Use: No


Substance Use Type: Reports: None


Hx Tobacco Use: No


Smoking Status (MU): Never Smoked Tobacco


Have You Smoked in the Last Year: No





Review of Systems


Positive: no symptoms reported.  Negative: incontinence


Positive: Myalgia, Decreased ROM, Other - decreased ability to walk


Positive: Weakness, Numbness - occasional, sole of L heel.  Negative: 

Paresthesia


All Other Systems Reviewed And Are Negative: Yes





Physical Exam





- Summary


Physical Exam Summary: 





Constitutional: Well-developed, Well-nourished, Alert. (-) Distressed. Pt is 

able to cough.


Skin: Warm, Dry


HENT: Normocephalic; Atraumatic


Eyes: Conjunctiva normal


Neck: Musculoskeletal ROM normal neck. (-) JVD, (-) Stridor, (-) Tracheal 

deviation


Cardio: Rhythm regular, rate normal, Heart sounds normal; Intact distal pulses; 

The pedal pulses are 2+ and symmetric. Radial pulses are 2+ and symmetric. (-) 

Murmur


Pulmonary/Chest wall: Effort normal. (-) Respiratory distress, (-) Wheezes, (-) 

Rales


Abd: Soft, (-) tenderness, (-) Distension, (-) Guarding, (-) Rebound


Musculoskeletal: (-) Edema, 3/5 strength in L hip abduction and leg extension. 2

/5 strength in hip inversion and eversion. 5/5 strength in leg sensation. 5/5 

strength in all other major joints.


Lymph: (-) Cervical adenopathy


Neuro: Alert, Oriented x3. Normal skin sensation upon touch.


Psych: Mood and affect Normal





Triage Information Reviewed: Yes


Vital Signs On Initial Exam: 


 Initial Vitals











Temp Pulse Resp BP Pulse Ox


 


 98 F   84   18   126/92   99 


 


 08/23/19 10:54  08/23/19 10:54  08/23/19 10:54  08/23/19 10:54  08/23/19 10:54











Vital Signs Reviewed: Yes





Diagnostics





- Vital Signs


 Vital Signs











  Temp Pulse Resp BP Pulse Ox


 


 08/23/19 10:54  98 F  84  18  126/92  99














- Laboratory


Result Diagrams: 


 08/23/19 11:25





 08/23/19 11:25


Lab Statement: Any lab studies that have been ordered have been reviewed, and 

results considered in the medical decision making process.





- Radiology


  ** Lumbar spine XR


Radiology Interpretation Completed By: Radiologist


Summary of Radiographic Findings: 1.  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 

THERE IS NO SUBLUXATION WITH FLEXION AND EXTENSION. THERE IS MINIMAL EXCURSION 

WITH FLEXION AND EXTENSION.  2.  FACET OSTEOARTHRITIS WITH MILD DEGENERATIVE 

DISC DISEASE.  ED physician has reviewed this report.





- CT


  ** Lumbar spine CT


CT Interpretation Completed By: Radiologist


Summary of CT Findings: 1. GRADE II ANTERIOR SPONDYLOLISTHESIS AT THE L5-S1 

LEVEL UNCHANGED.  2. MODERATE LUMBAR SPONDYLITIC CHANGES AS DESCRIBED.  ED 

physician has reviewed this report.





Lower Extremity Course/Dx





- Course


Course Of Treatment: Patient is here with left lower extremity weakness and 

difficulty coughing.  Patient does have obvious left lower extremity weakness 

on exam with lower back.  Patient had an MRI which was reviewed by 

neurosurgery.  Neurosurgery recommended flexion/extension films and a CT scan.  

Patient had those performed.  Patient had no evidence of any changes from MRI.  

Neurology was consulted and they felt subtle neurologic findings concerning for 

ALS.  Patient is admitted to medicine with neurology on board.





- Diagnoses


Provider Diagnoses: 


 Weakness of left lower extremity, Fasciculations of muscle, Back pain








Discharge ED





- Sign-Out/Discharge


Documenting (check all that apply): Patient Departure





- Discharge Plan


Condition: Stable


Disposition: ADMITTED TO [facility]


Referrals: 


Hazel Brito MD [Primary Care Provider] - 





- Billing Disposition and Condition


Condition: STABLE


Disposition: Admitted to [facility]





- Attestation Statements


Document Initiated by Ed: Yes


Documenting Scribe: Laney Milner


Provider For Whom Ed is Documenting (Include Credential): Bairon Mcfadden MD.


Scribe Attestation: 


Laney CHAVEZ scribed for Bairon Mcfadden MD. on 08/23/19 at 1621. 


Scribe Documentation Reviewed: Yes


Provider Attestation: 


The documentation as recorded by the Laney felix accurately 

reflects the service I personally performed and the decisions made by me, Bairon Mcfadden MD.


Status of Scribe Document: Viewed





Consult


Consult: 





1203 - I spoke with Dr. Zurita who reviewed the MRI and states it does not 

really explain the pt's sx. He recommends a CT of his lower spine.





1213 - I spoke with Dr. Pham about the pt's condition who will be coming to 

evaluate the patient.





1447 - Dr. Pham further examined the patient, and found fasciculations, muscle 

atrophy to the L thigh, and finger abnormalities. He would like the pt to be 

admitted for further testing for ALS.





1452 - Dr. Domingo accepts the pt for admission.

## 2019-08-23 NOTE — XMS REPORT
Summary of Care

 Created on:2019



Patient:Anu Valdez

Sex:Male

:1945

External Reference #:[id number]





Demographics







 Address  [address] [place] [address]

 

 Home Phone  [phone]

 

 Work Phone  [phone]

 

 Mobile Phone  [phone]

 

 Email Address  [email or web address]

 

 Preferred Language  English

 

 Marital Status  Not  or 

 

 Sikh Affiliation  Unknown

 

 Race  White

 

 Ethnic Group  Not  or 









Author







 Organization  [facility]

 

 Address  [address] [address]









Support







 Name  Relationship  Address  Phone

 

 Joseline Valdez  Unavailable  Unavailable  [phone]

 

 Joseline Valdez  Unavailable  Unavailable  [phone]









Care Team Providers







 Name  Role  Phone

 

 Hazel Brito MD  Primary Care Provider  [phone]

 

 Karen Guallpa MD  Unavailable  [phone]









Reason for Referral

MRI/CAT/PET Scan (Routine)





 Status  Reason  Specialty  Diagnoses /  Referred By  Referred To



       Procedures  Contact  Contact

 

 Authorized      Diagnoses



Spinal stenosis of cervical region  [place],  



       



Procedures



MR CERVICAL SPINE WO CONTRAST  MD Ricky



  



         [address] [address]



  



         Phone:  



         [phone]



  



         Fax: [phone]  





Refer to Department Only (Routine)





 Status  Reason  Specialty  Diagnoses /  Referred By  Referred To Contact



       Procedures  Contact  

 

 Authorized    Neurodiagnostic  Diagnoses



Weakness  [place],  [facility]



         Get García MD



  [address]







         [address] [address]







         [place]



  Phone: [phone]



         [address]  



         [address]



  



         Phone:  



         [phone]



  



         Fax:  



         [phone]  









Reason for Visit







 Reason  Comments

 

 New Patient  low back pain/left leg thigh pain



Refer to Department Only (Routine)





 Status  Reason  Specialty  Diagnoses /  Referred By  Referred To



       Procedures  Contact  Contact

 

 Closed    NEUROSURGERY /  Diagnoses



Spondylolisthesis, lumbar region  Zohaib Sotelo,  



     Neurosurgery    MD



  



         [address] [facility] [address]



  



         [place] [address]



  



         Phone:  



         [phone]



  



         Fax: [phone]  









Encounter Details







 Date  Type  Department  Care Team  Description

 

 2019  Office Visit  [facility]



  [place],  Weakness (Primary Dx);



     [address] García MD



  Spondylolisthesis, lumbar region;



     [address] [address]



  [address]



  Spinal stenosis of cervical region



     [phone]  [address] [address] [phone] (Fax)  







Allergies

No Known Allergiesdocumented as of this encounter (statuses as of 2019)



Medications







 Medication  Sig  Dispensed  Refills  Start Date  End Date  Status

 

 ibuprofen (MOTRIN) 200  Take 400 mg by    0      Active



 MG Oral Tab  mouth EVERY SIX          



   HOURS AS NEEDED          



   for Pain.          

 

 Multiple  Take  by mouth    0      Active



 Vitamins-Minerals  DAILY.          



 (PRESERVISION AREDS            



 PO)            

 

 indomethacin (INDOCIN)  Take 1 Cap by  60 Cap  0  2019    Active



 50 MG Oral Cap  mouth THREE          



   TIMES DAILY.          

 

 predniSONE (DELTASONE)  3 po qd x 3 then  30 Tab  0  2019    Active



 20 MG Oral Tab  2 po qd x3 then          



   i po qd x3 then          



   1/2 po qd x4          

 

 OXYcodone-acetaminophe  Take 1 Tab by  28 Tab  0  2019  
Active



 n (PERCOCET) 5-325 MG  mouth EVERY SIX          



 Oral Tab  HOURS AS NEEDED          



   (back pain) for          



   up to 7 days.          



   Max Daily          



   Amount: 4 Tabs.          



documented as of this encounter (statuses as of 2019)



Active Problems







 Problem  Noted Date

 

 Right foot pain  2018

 

 Dilated aortic root  2017

 

 S/P rotator cuff repair  2016









 Overview: 



right









 Prostate cancer  2014









 Overview: 







 treated with radiation therapy, Dr. Cobb urologist, Dr Raman oncologist:  44 
low



 doses. NO side effects.









 BMI 26.0-26.9,adult  2013









 Overview: 



This patient's BMI has been calculated and is above average, and BMI management 
plan is completed.









 Colon polyp  









 Overview: 







 next c-scop 









 Aortic insufficiency  









 Overview: 







 moderate on routine echo in  while in [place]









 Prostate Cancer  









 Overview: 







 always 2-2.5, was 3.9 in , >9 in ; followed by [facility], 
watchful



 waiting as of , with MRI surveillance, Dr. Muñoz









 Seasonal allergies  

 

 Skin Cancer  









 Overview: 







 SCC in , BCC 2010 Dr. Costello



documented as of this encounter (statuses as of 2019)



Resolved Problems







 Problem  Noted Date  Resolved Date

 

 Shoulder pain, right  2015

 

 Back pain    2017









 Overview: 







 spina bifida occulta with spondylolisthesis



documented as of this encounter (statuses as of 2019)



Immunizations







 Name  Administration Dates  Next Due

 

 Influenza (IM) Preservative Free  10/01/2016, 2013  

 

 Influenza Vaccine High Dose  10/11/2018, 2017, 2016,  



   2014  

 

 PNEUMOCOCCAL POLYSACCHARIDE VACCINE  2009  

 

 Pneumococcal Conjugate(13 Valent)  2016  

 

 TDAP Vaccine  2016  



documented as of this encounter



Social History







 Tobacco Use  Types  Packs/Day  Years Used  Date

 

 Never Smoker        









 Smokeless Tobacco: Never Used      









 Alcohol Use  Drinks/Week  oz/Week  Comments

 

 Yes  5-7 Standard drinks or equivalent  4.2 - 5.8  









 Social Isolation  Answer  Date Recorded

 

 In a typical week, how many times do you  More than three times a week  2019



 talk on the phone with family, friends, or    



 neighbors?    

 

 How often do you get together with friends  More than three times a week  



 or relatives?    

 

 How often do you attend Zoroastrian or  Not asked  



 Latter-day services?    

 

 Do you belong to any clubs or  Not asked  



 organizations such as Zoroastrian groups,    



 unions, fraternal or athletic groups, or    



 school groups?    

 

 How often do you attend meetings of the  Not asked  



 clubs or organizations you belong to?    

 

 Are you now , , ,    2019



 , never  or living with a    



 partner?    









 Physical Activity  Answer  Date Recorded

 

 On average, how many days per week do you engage in moderate to  3 days  2019



 strenuous exercise (like walking fast, running, jogging,    



 dancing, swimming, biking, or other activities that cause a    



 light or heavy sweat)?    

 

 On average, how many minutes do you engage in exercise at this  60 min  2019



 level?    









 Stress  Answer  Date Recorded

 

 Do you feel stress - tense, restless, nervous, or anxious,  Not at all  2019



 or unable to sleep at night because your mind is troubled    



 all the time - these days?    









 Financial Resource Strain  Answer  Date Recorded

 

 How hard is it for you to pay for the very basics like  Not hard at all  2019



 food, housing, medical care, and heating?    









 Intimate Partner Violence  Answer  Date Recorded

 

 Within the last year, have you been afraid of your partner or  No  2019



 ex-partner?    

 

 Within the last year, have you been humiliated or emotionally  No  2019



 abused in other ways by your partner or ex-partner?    

 

 Within the last year, have you been kicked, hit, slapped, or  No  2019



 otherwise physically hurt by your partner or ex-partner?    

 

 Within the last year, have you been raped or forced to have any  No  2019



 kind of sexual activity by your partner or ex-partner?    









 Food Insecurity  Answer  Date Recorded

 

 Within the past 12 months, you worried that your food would  Never true  2019



 run out before you got money to buy more.    

 

 Within the past 12 months, the food you bought just didn't  Never true  2019



 last and you didn't have money to get more.    









 Transportation Needs  Answer  Date Recorded

 

 In the past 12 months, has lack of transportation kept you from  No  2019



 medical appointments or from getting medications?    

 

 In the past 12 months, has lack of transportation kept you from  No  2019



 meetings, work, or getting things needed for daily living?    









 Sex Assigned at Birth  Date Recorded

 

 Not on file  









 Job Start Date  Occupation  Industry

 

 Not on file  Not on file  Not on file









 Travel History  Travel Start  Travel End









 No recent travel history available.



documented as of this encounter



Last Filed Vital Signs







 Vital Sign  Reading  Time Taken  Comments

 

 Blood Pressure  140/80  2019  1:42 PM EDT  

 

 Pulse  -  -  

 

 Temperature  -  -  

 

 Respiratory Rate  -  -  

 

 Oxygen Saturation  -  -  

 

 Inhaled Oxygen Concentration  -  -  

 

 Weight  83.9 kg (185 lb)  2019  1:42 PM EDT  

 

 Height  170.2 cm (5' 7")  2019  1:42 PM EDT  

 

 Body Mass Index  28.98  2019  1:42 PM EDT  



documented in this encounter



Progress Notes

Ricky Dawson MD - 2019  1:30 PM EDTFormatting of this note 
might be different from the original.

PATIENT:  Anu Valdez

MRN:  [id number]

:  1945

DATE OF SERVICE:  2019



REFERRING PRACTITIONER:  Zohaib Sotelo

PRIMARY CARE PROVIDER:  Hazel Brito



CHIEF COMPLAINT:

Chief Complaint

Patient presents with

 New Patient

  low back pain/left leg thigh pain



HISTORY OF PRESENT ILLNESS:

Mr. Valdez is a pleasant 74-year-old gentleman who presents to me with a few 
weeks of severe weakness of the left hip flexor.  He also has some chronic 
lower back pain.  He has a chronic L5-S1 spondylolisthesis that has been there 
since he was child.  He really denies any other specific numbness orweakness 
except for some numbness in the left foot.  He denies bowel or bladder 
difficulty.  There has been some pain in the left hip in the groin going down 
to the knee.  There has not been pain belowthe knee.  His recent lumbar MRI 
shows fusion between L5 and S1 at the side of the slip.  I do not appreciate 
spinal cord or nerve root compression at other levels.



Past Medical History:

Diagnosis Date

 Aortic insufficiency

 moderate on routine echo in  while in [place]

 Back pain

 spina bifida occulta with spondylolisthesis

 BCC (basal cell carcinoma of skin)

 forehead, Dr. Costello 2012, complete excision

 Colon polyp

 tubular adenoma transvers colon 

 Elevated PSA

 always 2-2.5, was 3.9 in 

 Hemorrhoids

 Hx of cardiovascular stress test 2016

 negative for ischemia.  Mildly dilated aortic root with mild aortic 
regurgitation

 Prostate cancer (HCC) 

 treated with radiation therapy, Dr. Cobb urologist, Dr Raman oncologist:  44 
low doses. NO side effects.

 Seasonal allergies

 Skin Cancer

 SCC in , BCC 2010 Dr. Costello

 VHD (valvular heart disease)

 aortic insufficiency



Past Surgical History:

Procedure Laterality Date

 ARTHROSCOPY, SHOULDER  ACROMIOPLASTY, DISTAL CLAVICLE Right 2015

 Procedure: ARTHROSCOPY SHOULDER ACROMIOPLASTY ROTATOR CUFF REPAIR RIGHT;  
Surgeon: Sam Rivera MD;  Location: [facility] MAIN OR

 DESTRUCT OF SKIN LESION

 HEMORRHOIDECTOMY  

 in [facility]

 SHOULDER ARTHROSCOPY Right 12/18/15

 RCR



Family History

Problem Relation Age of Onset

 Macular Degeneration Mother

 Colon Cancer Mother 55

 Stroke Mother

 Hypertension Mother

 Blood Disease Father

     Polycythemia Vera

 No Known Problems Sister

 Heart Daughter

     atrial fibrillation

 No Known Problems Daughter

 No Known Problems Daughter

 Cancer Paternal Grandfather 78

     colon cancer,  in surgery

 Diabetes Maternal Grandfather



Social History



Socioeconomic History

 Marital status: 

  Spouse name: joseline

 Number of children: 3

 Years of education: Not on file

 Highest education level: Not on file

Occupational History

 Not on file

Social Needs

 Financial resource strain: Not hard at all

 Food insecurity:

  Worry: Never true

  Inability: Never true

 Transportation needs:

  Medical: No

  Non-medical: No

Tobacco Use

 Smoking status: Never Smoker

 Smokeless tobacco: Never Used

Substance and Sexual Activity

 Alcohol use: Yes

  Alcohol/week: 4.2 - 5.8 standard drinks

  Types: 5 - 7 Standard drinks or equivalent per week

 Drug use: No

 Sexual activity: Yes

  Partners: Female

  Comment: 

Lifestyle

 Physical activity:

  Days per week: 3 days

  Minutes per session: 60 min

 Stress: Not at all

Relationships

 Social connections:

  Talks on phone: More than three times a week

  Gets together: More than three times a week

  Attends Latter-day service: Not on file

  Active member of club or organization: Not on file

  Attends meetings of clubs or organizations: Not on file

  Relationship status: 

 Intimate partner violence:

  Fear of current or ex partner: No

  Emotionally abused: No

  Physically abused: No

  Forced sexual activity: No

Other Topics Concern

 Back Care Not Asked

 Bike Helmet Not Asked

 Blood Transfusions Not Asked

 Caffeine Concern Not Asked

 Exercise Yes

  Comment: walking daily with wife

 Hobby Hazards Yes

  Comment: wine making, 

 International Travel Not Asked

  Service No

 Occupational Exposure Not Asked

 Seat Belt Not Asked

 Self-Exams Not Asked

 Sleep Concern No

 Special Diet No

 Stress Concern No

 Weight Concern Yes

  Comment: max weight = 189

Social History Narrative

 Semi-retired from [employer].

 Lives with wife in [place].

 3 daughters.

 He travels overseas for travel consultation about 3 times a year.



Current Outpatient Medications

Medication

 ibuprofen (MOTRIN) 200 MG Oral Tab

 indomethacin (INDOCIN) 50 MG Oral Cap

 Multiple Vitamins-Minerals (PRESERVISION AREDS PO)

 OXYcodone-acetaminophen (PERCOCET) 5-325 MG Oral Tab

 predniSONE (DELTASONE) 20 MG Oral Tab



No Known Allergies



REVIEW OF SYSTEMS:



Psychological ROS: negative

Ophthalmic ROS: negative

ENT ROS: negative

Hematological and Lymphatic ROS: negative

Endocrine ROS: negative

Respiratory ROS: no cough, shortness of breath, or wheezing

Cardiovascular ROS: no chest pain or dyspnea on exertion

Gastrointestinal ROS: no abdominal pain, change in bowel habits, or black or 
bloody stools

Genito-Urinary ROS: negative

Musculoskeletal ROS: See HPI

Neurological ROS: See HPI



PHYSICAL EXAMINATION:

/80  | Ht 5' 7" (1.702 m)  | Wt 185 lb (83.9 kg)  | BMI 28.98 kg/m  
Body mass index is 28.98 kg/m.

GENERAL:  alert, oriented, no acute distress.

SKIN:  normal, no rashes or abnormalities noted.

HEENT:  conjunctivae are clear,  nasopharynx is with mild edema, pink mucosa, 
and clear secretions, oropharynx is clear.

NECK:  Normal range of motion

LOWER BACK: Normal range of motion

LUNGS:  clear to auscultation bilaterally.

HEART:  Regular rate and rhythm without murmurs or gallops.

ABDOMEN:  Normal bowel sounds without distension

NEUROLOGICAL:



CONSTITUTIONAL: The patient appears to be in no acute distress.



MUSCULOSKELETAL: Station and gait are normal. Motor tone is normal in both 
upper and lower extremities.



Upper Extremity

 Deltoid Tricep Bicep Finger Extension Wrist Extension Intrinsics 

Right 5/5 4/5 5/5 5/5 5/5 4/5 4/5

Left 5/5 5/5 5/5 5/5 5/5 4/5 4/5





Lower Extremity

 Hip Flexion Knee Flexion Knee Extension Dorsi Flexion Plantar Flexion EHL

Right 4/5 5/5 5/5 5/5 5/5 5/5

Left 2/5 5/5 4/5 5/5 5/5 5/5



NEUROLOGICAL: Oriented to time, place, and person. Memory appears to be grossly 
intact. Language function appears to be normal with normal receptive and motor 
speech function.



CRANIAL NERVES:

2nd cranial nerve: Intact

3rd, 4th, and 6th cranial nerves: pupils equal round and reactive to light. 
Full EOMs.

5th cranial nerve: Intact

7th cranial nerve: Intact

8th cranial nerve: Intact

9th, 10th cranial nerves: Intact

11th cranial nerve: Intact.

12th cranial nerve: Intact



SENSATION: Intact throughout to light touch and pin prick.



REFLEXES:



 Biceps BR Triceps Knee Ankle

Right 2+ 2+ 2+ 2+ 2+

Left 2+ 2+ 2+ 0 2+



 Paez's sign: Positive bilaterally                   Babinski: Negative



COORDINATION: Finger-nose-finger examination done well.



IMPRESSION / PLAN:



  ICD-9-CM ICD-10-CM

1. Weakness 780.79 R53.1 REFER TO EMG

2. Spondylolisthesis, lumbar region 738.4 M43.16 REFER TO NEUROSURGERY

3. Spinal stenosis of cervical region 723.0 M48.02 MR CERVICAL SPINE WO CONTRAST



Medical decision making: This gentleman has unexplained weakness of both the 
upper and lower extremities with some hyperreflexia.  The weakness is most 
pronounced in the hip flexor which is less than antigravity.  I do not 
appreciate significant atrophy.  The left knee jerk is absent.  I would like 
toget a cervical MRI to exclude spinal stenosis.  I am also referring him for 
an EMG to see if there are denervation potentials in the left thigh area which 
might indicate neuropathy.  I will see him back after this is done.



Author:  Ricky Dawson MD 2019 14:04Electronically signed by 
Ricky Dawson MD at 2019  2:08 PM EDTdocumented in this encounter



Plan of Treatment







 Date  Type  Specialty  Care Team  Description

 

 2020  Chronic Care Management  Family Practice    









 Name  Type  Priority  Associated Diagnoses  Order Schedule

 

 MR CERVICAL SPINE WO  Imaging  Routine  Spinal stenosis of  Expected: 2019,



 CONTRAST      cervical region  Expires: 2020









 Name  Type  Priority  Associated Diagnoses  Order Schedule

 

 REFER TO EMG  Referral  Routine  Weakness  Expected: 2019, Expires:



         2020









 Health Maintenance  Due Date  Last Done  Comments

 

 ZOSTER IMMUNIZATION SERIES  1993    



 (1 of 2)      

 

 INFLUENZA VACCINE (#1)  2019  10/11/2018, 2017,  



     2017, Additional  



     history exists  

 

 MEDICARE ANNUAL WELLNESS  2020, 2016,  



 VISIT    2013, Additional  



     history exists  

 

 DEPRESSION SCREENING  2020  

 

 FALL RISK ASSESSMENT  2020, 2019  

 

 COLONOSCOPY SCREENING  02/15/2023  02/15/2018, 10/05/2015,  



     08/10/2009, Additional  



     history exists  

 

 PNEUMOCOCCAL 65+YRS  Completed  2016, 2009  

 

 HPV IMMUNIZATION SERIES  Aged Out    No longer eligible



       based on patient's age



       to complete this topic

 

 MENINGOCOCCAL VACCINE IMM  Aged Out    No longer eligible



       based on patient's age



       to complete this topic



documented as of this encounter



Implants







 Implanted  Type  Area    Device  Shelf  Model /



         Identifier  Expiration  Serial /



           Date  Lot

 

 5.5 Corkscrew [place] [id number]    Right:  [employer]      [id number] /



 Implanted: Qty: 1 on 2015 by Sam Rivera MD at [facility] 
   Shoulder         /



             [id number]

 

 Pushlock  3.5mm - [id number]    Right:  ARTHREX      [id number] /



 Implanted: Qty: 2 on 2015 by Sam Rivera MD at [facility] 
   Shoulder         /



             [id number]

 

 4.75 Biocomposite Swivel Lock    Right:  ARTHREX    2017  [id number] /



 Implanted: Qty: 1 on 2015 by Sam Rivera MD at [facility] 
   Shoulder         /



             [id number]



documented as of this encounter



Results

Not on filedocumented in this encounter



Visit Diagnoses







 Diagnosis

 

 Weakness - Primary







 Other malaise and fatigue

 

 Spondylolisthesis, lumbar region

 

 Spinal stenosis of cervical region







 Spinal stenosis in cervical region



documented in this encounter



Insurance







 Payer  Benefit Plan / Group  Subscriber ID  Effective Dates  Phone  Address  
Type

 

 MEDICARE  MEDICARE PART A & B  xxxxxxxxxxx  2008-Present      Medicare









 Guarantor Name  Account Type  Relation to  Date of Birth  Phone  Billing



     Patient      Address

 

 Anu Valdez  Personal/Family    1945  [phone]  [address] [place] [address]



         (Home)



  [address]







         [phone]  [place], [place]



         (Work)  [id number]



documented as of this encounter

## 2019-08-23 NOTE — XMS REPORT
Summary of Care

 Created on:2019



Patient:Anu Valdez

Sex:Male

:1945

External Reference #:[id number]





Demographics







 Address  [address] [place] [address]

 

 Home Phone  [phone]

 

 Work Phone  [phone]

 

 Mobile Phone  [phone]

 

 Email Address  [email or web address]

 

 Preferred Language  English

 

 Marital Status  Not  or 

 

 Confucianism Affiliation  Unknown

 

 Race  White

 

 Ethnic Group  Not  or 









Author







 Organization  [facility]

 

 Address  [address] [place] [address]









Support







 Name  Relationship  Address  Phone

 

 Joseline Valdez  Unavailable  Unavailable  [phone]

 

 Joseline Valdez  Unavailable  Unavailable  [phone]









Care Team Providers







 Name  Role  Phone

 

 Hazel Brito MD  Primary Care Provider  [phone]

 

 Karen Guallpa MD  Unavailable  [phone]









Reason for Visit







 Reason  Comments

 

 New Patient  Low back pain. DOI: 19 Patient twisted his lower back last 
Wednesday



   working out to loosen a stiff back. Pain radiates down left leg.







Encounter Details







 Date  Type  Department  Care Team  Description

 

 2019  Office Visit  [facility] -  Zohaib Sotelo MD



  Left sided sciatica



     [address]



  (Primary Dx)



     [address] [facility]



  [address]



  



     [address]



  [place], [place] [address] [place] [address]



  [phone] (Fax)  







Allergies

No Known Allergiesdocumented as of this encounter (statuses as of 2019)



Medications







 Medication  Sig  Dispensed  Refills  Start Date  End Date  Status

 

 ibuprofen (MOTRIN) 200  Take 400 mg by    0      Active



 MG Oral Tab  mouth EVERY SIX          



   HOURS AS NEEDED          



   for Pain.          

 

 Multiple  Take  by mouth    0      Active



 Vitamins-Minerals  DAILY.          



 (PRESERVISION AREDS PO)            

 

 indomethacin (INDOCIN)  Take 1 Cap by  60 Cap  0  2019    Active



 50 MG Oral Cap  mouth THREE TIMES          



   DAILY.          

 

 predniSONE (DELTASONE)  3 po qd x 3 then  30 Tab  0  2019    Active



 20 MG Oral Tab  2 po qd x3 then i          



   po qd x3 then 1/2          



   po qd x4          



documented as of this encounter (statuses as of 2019)



Active Problems







 Problem  Noted Date

 

 Right foot pain  2018

 

 Dilated aortic root  2017

 

 S/P rotator cuff repair  2016









 Overview: 



right









 Prostate cancer  2014









 Overview: 







 treated with radiation therapy, Dr. Cobb urologist, Dr Raman oncologist:  44 
low



 doses. NO side effects.









 BMI 26.0-26.9,adult  2013









 Overview: 



This patient's BMI has been calculated and is above average, and BMI management 
plan is completed.









 Colon polyp  









 Overview: 







 next c-scop 









 Aortic insufficiency  









 Overview: 







 moderate on routine echo in  while in [place]









 Prostate Cancer  









 Overview: 







 always 2-2.5, was 3.9 in , >9 in ; followed by [facility], 
watchful



 waiting as of , with MRI surveillance, Dr. Mñuoz









 Seasonal allergies  

 

 Skin Cancer  









 Overview: 







 SCC in , BCC 2010 Dr. Costello



documented as of this encounter (statuses as of 2019)



Resolved Problems







 Problem  Noted Date  Resolved Date

 

 Shoulder pain, right  2015

 

 Back pain    2017









 Overview: 







 spina bifida occulta with spondylolisthesis



documented as of this encounter (statuses as of 2019)



Immunizations







 Name  Administration Dates  Next Due

 

 Influenza (IM) Preservative Free  10/01/2016, 2013  

 

 Influenza Vaccine High Dose  10/11/2018, 2017, 2016,  



   2014  

 

 PNEUMOCOCCAL POLYSACCHARIDE VACCINE  2009  

 

 Pneumococcal Conjugate(13 Valent)  2016  

 

 TDAP Vaccine  2016  



documented as of this encounter



Social History







 Tobacco Use  Types  Packs/Day  Years Used  Date

 

 Never Smoker        









 Smokeless Tobacco: Never Used      









 Alcohol Use  Drinks/Week  oz/Week  Comments

 

 Yes  5-7 Standard drinks or equivalent  4.2 - 5.8  









 Social Isolation  Answer  Date Recorded

 

 In a typical week, how many times do you  More than three times a week  2019



 talk on the phone with family, friends, or    



 neighbors?    

 

 How often do you get together with friends  More than three times a week  



 or relatives?    

 

 How often do you attend [employer] or  Not asked  



 Yazidi services?    

 

 Do you belong to any clubs or  Not asked  



 organizations such as Baptist groups,    



 unions, fraternal or athletic groups, or    



 school groups?    

 

 How often do you attend meetings of the  Not asked  



 clubs or organizations you belong to?    

 

 Are you now , , ,    2019



 , never  or living with a    



 partner?    









 Physical Activity  Answer  Date Recorded

 

 On average, how many days per week do you engage in moderate to  3 days  2019



 strenuous exercise (like walking fast, running, jogging,    



 dancing, swimming, biking, or other activities that cause a    



 light or heavy sweat)?    

 

 On average, how many minutes do you engage in exercise at this  60 min  2019



 level?    









 Stress  Answer  Date Recorded

 

 Do you feel stress - tense, restless, nervous, or anxious,  Not at all  2019



 or unable to sleep at night because your mind is troubled    



 all the time - these days?    









 Financial Resource Strain  Answer  Date Recorded

 

 How hard is it for you to pay for the very basics like  Not hard at all  2019



 food, housing, medical care, and heating?    









 Intimate Partner Violence  Answer  Date Recorded

 

 Within the last year, have you been afraid of your partner or  No  2019



 ex-partner?    

 

 Within the last year, have you been humiliated or emotionally  No  2019



 abused in other ways by your partner or ex-partner?    

 

 Within the last year, have you been kicked, hit, slapped, or  No  2019



 otherwise physically hurt by your partner or ex-partner?    

 

 Within the last year, have you been raped or forced to have any  No  2019



 kind of sexual activity by your partner or ex-partner?    









 Food Insecurity  Answer  Date Recorded

 

 Within the past 12 months, you worried that your food would  Never true  2019



 run out before you got money to buy more.    

 

 Within the past 12 months, the food you bought just didn't  Never true  2019



 last and you didn't have money to get more.    









 Transportation Needs  Answer  Date Recorded

 

 In the past 12 months, has lack of transportation kept you from  No  2019



 medical appointments or from getting medications?    

 

 In the past 12 months, has lack of transportation kept you from  No  2019



 meetings, work, or getting things needed for daily living?    









 Sex Assigned at Birth  Date Recorded

 

 Not on file  









 Job Start Date  Occupation  Industry

 

 Not on file  Not on file  Not on file









 Travel History  Travel Start  Travel End









 No recent travel history available.



documented as of this encounter



Last Filed Vital Signs







 Vital Sign  Reading  Time Taken  Comments

 

 Blood Pressure  146/90  2019  8:22 AM EDT  

 

 Pulse  72  2019  8:22 AM EDT  

 

 Temperature  -  -  

 

 Respiratory Rate  -  -  

 

 Oxygen Saturation  -  -  

 

 Inhaled Oxygen Concentration  -  -  

 

 Weight  84.8 kg (187 lb)  2019  8:22 AM EDT  

 

 Height  170.2 cm (5' 7")  2019  8:22 AM EDT  

 

 Body Mass Index  29.29  2019  8:22 AM EDT  



documented in this encounter



Progress Notes

Zohaib Sotelo MD - 2019  8:30 AM EDTFormatting of this note might be 
different from the original.

Name:  Anu Valdez

MRN:  [id number]

:  1945

Date of Service:  2019



Referring Provider:  Self

Primary Care Provider:  Hazel Brito



Chief Complaint

Patient presents with

 New Patient

  Low back pain. DOI: 19 Patient twisted his lower back last Wednesday 
working out to loosen a stiff back. Pain radiates down left leg.



Past Medical History:

Diagnosis Date

 Aortic insufficiency

 moderate on routine echo in  while in [place]

 Back pain

 spina bifida occulta with spondylolisthesis

 BCC (basal cell carcinoma of skin)

 forehead, Dr. Costello 2012, complete excision

 Colon polyp

 tubular adenoma transvers colon 

 Elevated PSA

 always 2-2.5, was 3.9 in 

 Hemorrhoids

 Hx of cardiovascular stress test 2016

 negative for ischemia.  Mildly dilated aortic root with mild aortic 
regurgitation

 Prostate cancer (HCC) 

 treated with radiation therapy, Dr. Cobb urologist, Dr Raman oncologist:  44 
low doses. NO side effects.

 Seasonal allergies

 Skin Cancer

 SCC in , BCC 2010 Dr. Costello

 VHD (valvular heart disease)

 aortic insufficiency



Past Surgical History:

Procedure Laterality Date

 ARTHROSCOPY, SHOULDER  ACROMIOPLASTY, DISTAL CLAVICLE Right 2015

 Procedure: ARTHROSCOPY SHOULDER ACROMIOPLASTY ROTATOR CUFF REPAIR RIGHT;  
Surgeon: Sam Rivera MD;  Location: [facility] MAIN OR

 DESTRUCT OF SKIN LESION

 HEMORRHOIDECTOMY  

 in [facility]

 SHOULDER ARTHROSCOPY Right 12/18/15

 RCR



Current Outpatient Medications

Medication Sig

 ibuprofen (MOTRIN) 200 MG Oral Tab Take 400 mg by mouth EVERY SIX HOURS 
AS NEEDED for Pain.

 indomethacin (INDOCIN) 50 MG Oral Cap Take 1 Cap by mouth THREE TIMES 
DAILY.

 Multiple Vitamins-Minerals (PRESERVISION AREDS PO) Take  by mouth DAILY.

 predniSONE (DELTASONE) 20 MG Oral Tab 3 po qd x 3 then 2 po qd x3 then i 
po qd x3 then 1/2 poqd x4



No current facility-administered medications for this visit.



No Known Allergies

Social History



Socioeconomic History

 Marital status: 

  Spouse name: joseline

 Number of children: 3

 Years of education: Not on file

 Highest education level: Not on file

Occupational History

 Not on file

Social Needs

 Financial resource strain: Not hard at all

 Food insecurity:

  Worry: Never true

  Inability: Never true

 Transportation needs:

  Medical: No

  Non-medical: No

Tobacco Use

 Smoking status: Never Smoker

 Smokeless tobacco: Never Used

Substance and Sexual Activity

 Alcohol use: Yes

  Alcohol/week: 4.2 - 5.8 standard drinks

  Types: 5 - 7 Standard drinks or equivalent per week

 Drug use: No

 Sexual activity: Yes

  Partners: Female

  Comment: 

Lifestyle

 Physical activity:

  Days per week: 3 days

  Minutes per session: 60 min

 Stress: Not at all

Relationships

 Social connections:

  Talks on phone: More than three times a week

  Gets together: More than three times a week

  Attends Yazidi service: Not on file

  Active member of club or organization: Not on file

  Attends meetings of clubs or organizations: Not on file

  Relationship status: 

 Intimate partner violence:

  Fear of current or ex partner: No

  Emotionally abused: No

  Physically abused: No

  Forced sexual activity: No

Other Topics Concern

 Back Care Not Asked

 Bike Helmet Not Asked

 Blood Transfusions Not Asked

 Caffeine Concern Not Asked

 Exercise Yes

  Comment: walking daily with wife

 Hobby Hazards Yes

  Comment: wine making, 

 International Travel Not Asked

  Service No

 Occupational Exposure Not Asked

 Seat Belt Not Asked

 Self-Exams Not Asked

 Sleep Concern No

 Special Diet No

 Stress Concern No

 Weight Concern Yes

  Comment: max weight = 189

Social History Narrative

 Semi-retired from [employer].

 Lives with wife in [place].

 3 daughters.

 He travels overseas for travel consultation about 3 times a year.



Family History

Problem Relation Age of Onset

 Macular Degeneration Mother

 Colon Cancer Mother 55

 Stroke Mother

 Hypertension Mother

 Blood Disease Father

     Polycythemia Vera

 No Known Problems Sister

 Heart Daughter

     atrial fibrillation

 No Known Problems Daughter

 No Known Problems Daughter

 Cancer Paternal Grandfather 78

     colon cancer,  in surgery

 Diabetes Maternal Grandfather



ROS:  Review of systems intake completed by clinical staff.  I have reviewed 
and agree with their documentation.



24-year-old active man presents with 1 week history of severe left-sided low 
back pain with anteriorlateral radiation into the proximal leg.  Was working 
out on an exercise machine and the next day experience the pain.  No help from 
anti-inflammatories.  Took a long car ride to [place] at the [facility] last 
week.  Was told in the 1960s that he had at least a 50% spondylolisthesis which 
was felt to probably be congenital.  Generally does not have symptoms.



Physical exam shows a healthy-appearing man in no acute distress.  Alert and 
oriented.  Antalgic gait.  Spine range of motion decreased.  Straight leg 
raising negative.  No sensory abnormalities.  Question of weakness to hip 
flexion but may be secondary to pain.  Rest of left lower extremity exam normal.



X-rays of the lumbosacral spine show spondylosis and a grade 3/4 
spondylolisthesis at L5-S1 which appears to be chronic.



Impression: Acute left radiculopathy.



Plan: Sliding dose of prednisone.  Reevaluate in 1 week.







Impression:

No diagnosis found.



Author:  Zohaib Sotelo MD  2019  08:51



This record contains sections created with voice recognition software.  It has 
been electronically signed.  A reasonable attempt at proofreading has been 
made.  Please call with any questions or corrections.



Electronically signed by Zohaib Sotelo MD at 2019  8:55 AM 
EDTdocumented in this encounter



Plan of Treatment







 Date  Type  Specialty  Care Team  Description

 

 2020  Chronic Care Management  Family Practice    









 Health Maintenance  Due Date  Last Done  Comments

 

 ZOSTER IMMUNIZATION SERIES  1993    



 (1 of 2)      

 

 INFLUENZA VACCINE (#1)  2019  10/11/2018, 2017,  



     10/01/2016, Additional  



     history exists  

 

 MEDICARE ANNUAL WELLNESS  2020, 2016,  



 VISIT    2013, Additional  



     history exists  

 

 DEPRESSION SCREENING  2020  

 

 FALL RISK ASSESSMENT  2020, 2019  

 

 COLONOSCOPY SCREENING  02/15/2023  02/15/2018, 10/05/2015,  



     08/10/2009, Additional  



     history exists  

 

 PNEUMOCOCCAL 65+YRS  Completed  2016, 2009  

 

 HPV IMMUNIZATION SERIES  Aged Out    No longer eligible



       based on patient's age



       to complete this topic

 

 MENINGOCOCCAL VACCINE IMM  Aged Out    No longer eligible



       based on patient's age



       to complete this topic



documented as of this encounter



Implants







 Implanted  Type  Area    Device  Shelf  Model /



         Identifier  Expiration  Serial /



           Date  Lot

 

 5.5 Corkscrew [place] [id number]    Right:  ARTHREX      [id number] /



 Implanted: Qty: 1 on 2015 by Sam Rivera MD at [facility] 
   Shoulder         /



             [id number]

 

 Pushlock  3.5mm - [id number]    Right:  ARTHREX      [id number] /



 Implanted: Qty: 2 on 2015 by Sam Rivera MD at [facility]         /



             [id number]

 

 4.75 Biocomposite Swivel Lock    Right:  ARTHREX    2017  [id number] /



 Implanted: Qty: 1 on 2015 by Sam Rivera MD at [facility]         /



             [id number]



documented as of this encounter



Results

Not on filedocumented in this encounter



Visit Diagnoses







 Diagnosis

 

 Left sided sciatica - Primary







 Sciatica



documented in this encounter



Insurance







 Payer  Benefit Plan / Group  Subscriber ID  Effective Dates  Phone  Address  
Type

 

 MEDICARE  MEDICARE PART A & B  xxxxxxxxxxx  2008-Present      Medicare









 Guarantor Name  Account Type  Relation to  Date of Birth  Phone  Billing



     Patient      Address

 

 Anu Valdez  Personal/Family    1945  [phone]  [address] [place] [address]



         (Home)



  [address]







         [phone]  [place], [place]



         (Work)  [id number]



documented as of this encounter

## 2019-08-23 NOTE — HP
CC:  Dr. Brito *

 

HISTORY AND PHYSICAL:

 

DATE OF ADMISSION:  19

 

PRIMARY CARE PROVIDER:  Dr. Brito

 

OTHER PROVIDERS:  Dr. Pham, Neurology.

 

ATTENDING PHYSICIAN:  Dr. Elvie Martinez * dictated by Earl Woodard NP).

 

CHIEF COMPLAINT:

1.  Left leg weakness.

2.  Inability to cough.

 

HISTORY OF PRESENT ILLNESS:  Mr. Valdez is a 74-year-old male with a past 
medical history significant for arthritis, L5-S1 congenital issue, prostate 
cancer who presented to the emergency department today with complaints of left 
leg weakness.  The patient reports he has been having issues with his left leg 
since approximately 19.  He reports on 19 he was at the gym and he 
felt a "pinch" in his back and started having back pain and less control of his 
left thigh.  He reports he was seen by his primary care provider for these 
symptoms and was provided with pain medications and had imaging which had no 
significant findings.  He reports that today he decided to present to the 
emergency room as he could not cough, and when he told his primary care this, 
they became worried.  In addition, the control of his left thigh has become 
worse.  While in the emergency department, the patient had a lumbar CT which 
revealed grade 2 anterior spondylolisthesis at the level of L5-S1 level 
unchanged and moderate lumbar spondylitic changes.  The patient also had lumbar 
spine x-ray that revealed grade 2 anterolisthesis of L5 and S1 with no 
subluxation, minimal exertion with flexion and extension, osteoarthritis, mild 
degenerative disk disease.  He also had CBC which revealed a slightly elevated 
WBC at 12.7.  He had a CMP with a  low sodium at 128 and a slightly elevated 
CRP at 13.98.  Given the concern of his inability to cough, a new onset 
inability to control left thigh, Dr. Pham for Neurology was consulted and 
hospitalist team was consulted for admission.

 

PAST MEDICAL HISTORY:

1.  Arthritis.

2.  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

3.  Pancreatic cancer, history of.

 

PAST SURGICAL HISTORY:

1.  Hemorrhoidectomy.

2.  Right rotator cuff repair.

 

HOME MEDICATIONS:

1.  PreserVision AREDS 2 soft gels b.i.d.

2.  Vitamin D3 1000 units p.o. q. a.m.

3.  MiraLAX b.i.d. p.r.n.

 

ALLERGIES:  No known drug allergies.

 

FAMILY HISTORY:  Mother  at age 101 after a stroke.  Father  
due to polycythemia vera in his 80s.  Father carried a history of high 
cholesterol.  No known history of CAD in the family.  The patient reports his 
grandfather had diabetes.  The patient reports mother also carried a history of 
colon cancer.

 

SOCIAL HISTORY:  The patient does not smoke.  The patient does not use drugs.  
The patient reports 1 glass of wine daily.  The patient is retired.  The 
patient lives with his wife.  Before 19, the patient was independent with 
ADLs; after 19, the patient has been using a cane and has progressed to a 
walker over the past month.  The patient's surrogate decision maker will be his 
wife, Joseline Valdez, [phone], in the event he cannot make decisions for 
himself.

 

REVIEW OF SYSTEMS:  The patient reports back pain near bilateral hips, which he 
currently rates at 0 as he is lying still, but if he were to move it would be a 
5 to 6.  The patient reports significant weakness in the left upper thigh as he 
is unable to raise his left upper leg or control that thigh.  The patient 
reports inability to cough.  The patient reports numbness and tingling in his 
left heel. The patient denies loss of bowel or bladder.  The patient denies 
fever, anorexia, chest pain, edema, shortness of breath, nausea, vomiting, 
diarrhea, abdominal pain, dysuria, visual complaints, difficulty swallowing, 
rashes or lesions, anxiety or depression.

 

                               PHYSICAL EXAMINATION

 

GENERAL:  Mr. Valdez is a 74-year-old male who is lying in the bed on his 
left side.  He appears to be in no acute distress.  He appears stated age.

 

VITAL SIGNS:  Temp 98, HR 78, RR 15, O2 saturation 97% on room air, /57.

 

HEENT:  EOMs intact.  PERRLA.  Sclerae without icterus.  Oral mucosa is moist 
without lesion.  Posterior pharynx is clear.

 

NECK:  No lymphadenopathy.  Supple.

 

RESPIRATORY:  Symmetrical chest expansion.  No accessory muscle use.  Lungs are 
clear to auscultation.  No wheezes, rhonchi, or rales.

 

CARDIOVASCULAR:  Regular rate and rhythm.  S1, S2 present.  No murmurs, rubs, 
or gallops.

 

ABDOMEN:  Soft, nontender.  Bowel sounds are normoactive.

 

EXTREMITIES:  Skin is warm and smooth bilaterally.  No edema.  No clubbing or 
cyanosis.

 

MUSCULOSKELETAL:  The patient reports pain in lower back.

 

NEURO:  The patient is awake, alert, and oriented x4.  Cranial nerves are II 
through XII are grossly intact.  Motor strength is 5/5 in upper extremities.  
Motor strength is 5/5 in ankles.  Strength is intact in right upper leg.  The 
patient is unable to participate in strength assessment of left upper leg.

 

SKIN:  Grossly intact.

 

 DIAGNOSTIC STUDIES/LAB DATA:  WBC 12.7, hemoglobin 15.8, hematocrit 46, 
platelets 284.  Sodium 128, potassium 3.9, chloride 94, carbon dioxide 27, BUN 
14, creatinine 0.95.  B12 of 424.  TSH 0.82.

 

Imaging as above in HPI.

 

ASSESSMENT AND PLAN:  Mr. Valdez is a 74-year-old male with a past medical 
history significant for arthritis, congenital L5-S1 issue, prostate cancer who 
presented to the emergency department with decreased motor movement in left 
thigh and inability to produce cough.  The patient will be placed OBV.

 

1.  Neurological deficit, left thigh:  As mentioned above, the patient had 
imaging which was fairly unremarkable.  The patient was seen by Dr. Pham here 
in the emergency department, who has ordered further imaging including the MRI 
and EMG. Dr. Pham has also ordered labs.  Dr. Pham will follow along with the 
patient while he is here in the hospital.  There is some concern for possible 
amyotrophic lateral sclerosis, but we will know more once imaging is completed.

2.  Decreased cough. The patient reports that for approximately 1 week he has 
been unable to elicit a cough successfully.  The patient has had no difficulty 
swallowing or breathing.  We will provide supportive care, elicit respiratory 
therapist help as needed, and get a swallow evaluation.

3.  Back pain:  The patient reports that he was taking oxycodone for his lower 
back pain, but this made him incredibly constipated.  He reports that his pain 
is 0 with certain positions, but can increase to 5 or 6.  I will order Tylenol 
for pain currently, and if he needs more, I will order additional pain 
medication.  I have also ordered physical therapy and occupational therapy.  It 
should mentioned that the patient was seen by his primary care provider after 
this episode at the gym on 19.  I have requested records from [facility] 
including progress notes and any imaging since 19.

4.  Arthritis:  I have ordered Tylenol while the patient is inpatient.

5.  History of congenital issue with L5-S1:  Once again, imaging has been 
completed here in the emergency department and we will be completing further 
imaging with MRI.  I have ordered Tylenol for pain control.  If the patient 
needs additional pain control, we will order at that point.  Physical Therapy 
and Occupational Therapy will be consulted.

6.  History of prostate cancer:  The patient reports he has every 6 months PSA.
  I have requested records from [facility].

7.  FEN:  The patient will be provided with a regular diet.

8.  Code status:  The patient is a full code.

9.  DVT prophylaxis:  Based on the DVT Risk Assessment, the patient is moderate 
risk.  I will order subcu heparin.

 

TIME SPENT:  Approximately 65 minutes was spent on this admission, greater than 
half the time was spent with the patient obtaining my history, performing 
physical exam, and reviewing my plan of care.

 

This case was discussed with my attending, Dr. Martinez, who is in agreement with 
my plan of care.

 

 Reviewed by EARL WOODARD NP 19 @ 1641

 

[id number]/CPS #: [id number]

JYOTHI

## 2019-08-24 LAB
ANION GAP SERPL CALC-SCNC: 7 MMOL/L (ref 2–11)
BASOPHILS # BLD AUTO: 0.1 10^3/UL (ref 0–0.2)
BUN SERPL-MCNC: 16 MG/DL (ref 6–24)
BUN/CREAT SERPL: 17 (ref 8–20)
CALCIUM SERPL-MCNC: 9 MG/DL (ref 8.6–10.3)
CHLORIDE SERPL-SCNC: 97 MMOL/L (ref 101–111)
EOSINOPHIL # BLD AUTO: 0.1 10^3/UL (ref 0–0.6)
GLUCOSE SERPL-MCNC: 113 MG/DL (ref 70–100)
HCO3 SERPL-SCNC: 26 MMOL/L (ref 22–32)
HCT VFR BLD AUTO: 44 % (ref 42–52)
HGB BLD-MCNC: 14.7 G/DL (ref 14–18)
LYMPHOCYTES # BLD AUTO: 1.6 10^3/UL (ref 1–4.8)
MCH RBC QN AUTO: 31 PG (ref 27–31)
MCHC RBC AUTO-ENTMCNC: 33 G/DL (ref 31–36)
MCV RBC AUTO: 91 FL (ref 80–94)
MONOCYTES # BLD AUTO: 1 10^3/UL (ref 0–0.8)
MONOCYTES NFR FLD: 11 %
NEUTROPHILS # BLD AUTO: 8.4 10^3/UL (ref 1.5–7.7)
NRBC # BLD AUTO: 0 10^3/UL
NRBC BLD QL AUTO: 0.1
PLATELET # BLD AUTO: 275 10^3/UL (ref 150–450)
POTASSIUM SERPL-SCNC: 3.9 MMOL/L (ref 3.5–5)
RBC # BLD AUTO: 4.83 10^6 /UL (ref 4.18–5.48)
SODIUM SERPL-SCNC: 130 MMOL/L (ref 135–145)
WBC # BLD AUTO: 11.1 10^3/UL (ref 3.5–10.8)

## 2019-08-24 PROCEDURE — 009U3ZX DRAINAGE OF SPINAL CANAL, PERCUTANEOUS APPROACH, DIAGNOSTIC: ICD-10-PCS | Performed by: ANESTHESIOLOGY

## 2019-08-24 RX ADMIN — HEPARIN SODIUM SCH UNITS: 5000 INJECTION INTRAVENOUS; SUBCUTANEOUS at 05:41

## 2019-08-24 RX ADMIN — Medication SCH UNITS: at 09:06

## 2019-08-24 RX ADMIN — THERA TABS SCH TAB: TAB at 09:07

## 2019-08-24 RX ADMIN — THERA TABS SCH TAB: TAB at 21:03

## 2019-08-24 NOTE — PN
Hospitalist Progress Note


Date of Service: 08/24/19








Discussed Cerebral Spinal Fluid results with Dr Pham. Plan to add Lyme to CSF (

order placed and lab called). Patient ordered one dose Rocephin 2 jann IV now 

and Doxycycline  mg BID starting tomorrow.

## 2019-08-24 NOTE — PN
Subjective


Date of Service: 08/24/19


Interval History: 





73 y/o M with PMH of Arthritis, L5-S1 slip(congenital), prostate cancer(s/p 

radiation), multiple basal cell carcinomas,  presented with left leg weakness 

for 24 days and weaker cough for 1 week. EMG with nerve conduction study done 

which was inconclusive. Plan is to do LP. DDx includes lyme or GBS.





Weakness on left lower limb still present. Also has inability to cough and also 

noticed weak abdominal muscle.


No choking. able to swallow.


No incontinence.





Objective


Active Medications: 








Acetaminophen (Tylenol Tab*)  650 mg PO Q4H PRN


   PRN Reason: MILD PAIN or TEMP > 100.4


Cholecalciferol (Vitamin D Tab*)  1,000 units PO QAM [facility]


   Last Admin: 08/24/19 09:06 Dose:  1,000 units


Multivitamins/Minerals (Theragran/Minerals Tab*)  1 tab PO BID [facility]


   Last Admin: 08/24/19 09:07 Dose:  1 tab


Polyethylene Glycol/Electrolytes (Miralax*)  17 gm PO 0800,2100 PRN


   PRN Reason: CONSTIPATION








 Vital Signs - 8 hr











  08/24/19 08/24/19





  11:15 15:15


 


Temperature 96.8 F 97.7 F


 


Pulse Rate 74 84


 


Respiratory 16 16





Rate  


 


Blood Pressure 109/67 121/75





(mmHg)  


 


O2 Sat by Pulse 98 98





Oximetry  











Oxygen Devices in Use Now: None


Exam: 





Patient is sitting on a chair and having his breakfast without any acute 

distress.


HEENT: Normal


Lungs: clear on b/l side


Heart: S1/S2 heard with no any murmur.


ABDOMEN: Soft, nontender and nontender. Normal bowel sound heard


Extremity: No any swelling, cyanosis or clubbing.


Neuro: Alert, consciuos and co-operative. Fasiculation present on left upper arm

, left lower leg. Power in LLE is 2/RLE is 5.


Grasp is decreased in right hand. power in RUE mad LUE is 5.Could not elicit 

plantar response b/l. Decrease reflex in b/l knee.


Result Diagrams: 


 08/24/19 05:52





 08/24/19 05:52


Additional Lab and Data: 





 Laboratory Results - last 24 hr











  08/24/19 08/24/19





  05:52 05:52


 


WBC  11.1 H 


 


RBC  4.83 


 


Hgb  14.7 


 


Hct  44 


 


MCV  91 


 


MCH  31 


 


MCHC  33 


 


RDW  14 


 


Plt Count  275 


 


MPV  7.9 


 


Neut % (Auto)  75.6 


 


Lymph % (Auto)  14.2 


 


Mono % (Auto)  8.8 


 


Eos % (Auto)  0.9 


 


Baso % (Auto)  0.5 


 


Absolute Neuts (auto)  8.4 H 


 


Absolute Lymphs (auto)  1.6 


 


Absolute Monos (auto)  1.0 H 


 


Absolute Eos (auto)  0.1 


 


Absolute Basos (auto)  0.1 


 


Absolute Nucleated RBC  0.0 


 


Nucleated RBC %  0.1 


 


Sodium   130 L


 


Potassium   3.9


 


Chloride   97 L


 


Carbon Dioxide   26


 


Anion Gap   7


 


BUN   16


 


Creatinine   0.94


 


Est GFR ( Amer)   94.4


 


Est GFR (Non-Af Amer)   78.0


 


BUN/Creatinine Ratio   17.0


 


Glucose   113 H


 


Calcium   9.0














Assess/Plan/Problems-Billing


Assessment: 





73 y/o M with PMH of Arthritis, L5-S1 slip(congenital), prostate cancer(s/p 

radiation), multiple basal cell carcinomas presented with left leg weakness for 

24 days and weakened cough for 1 week. EMG with nerve conduction study done 

which was inconclusive. Plan is to do LP. Ddx includes Lyme or GBS and others.








- Patient Problems


(1) Left leg weakness


Current Visit: Yes   Status: Acute   Code(s): R29.898 - OTH SYMPTOMS AND SIGNS 

INVOLVING THE MUSCULOSKELETAL SYSTEM   SNOMED Code(s): 815053557


   





(2) Back pain


Current Visit: Yes   Status: Acute   Code(s): M54.9 - DORSALGIA, UNSPECIFIED   

SNOMED Code(s): 890055467


   





(3) Arthritis


Current Visit: Yes   Status: Acute   Code(s): M19.90 - UNSPECIFIED 

OSTEOARTHRITIS, UNSPECIFIED SITE   SNOMED Code(s): 6661372


   





(4) DVT prophylaxis


Current Visit: Yes   Status: Acute   Code(s): Z29.9 - ENCOUNTER FOR 

PROPHYLACTIC MEASURES, UNSPECIFIED   SNOMED Code(s): 643727558


   





(5) Full code status


Current Visit: Yes   Status: Acute   Code(s): Z78.9 - OTHER SPECIFIED HEALTH 

STATUS   SNOMED Code(s): 509141597


   


Status and Disposition: 





In patient


Attending: Joseph Mtz





Attestation


Documenting Resident: Jose Goddard


Supervising Physician: Joseph Mtz


Attending/Supervising Physician Comment: 








UE strength intact b/l in biceps, deltoids, triceps, wrist flex/ext but weak in 

finger abductors


left knee extension 4/5, flexion 5/5. 


left hip flexion less than 3/5. 


able to walk with walker though left knee beena sometimes. 





EMG/NCS inconclusive


Getting LP, DEONDRE, heavy metals, CSF studies (including cytology, lyme, cell 

count culture, protein, IgG)


adding paraneoplastic panel given pCA and multiple basal cell carcinoma (to 

deep margin) [phone]. 








Attestation: 


This service has been performed in part by a resident under the direction of a 

teaching physician.I, Joseph Mtz, performed the service, or was physically 

present during the critical, or key portions of the service, furnished by the 

resident. I participated in the management of the patient.

## 2019-08-24 NOTE — PN
Subjective


Date of Service: 08/24/19


Length of Stay: 1 Days





Neurology is following for generalized weakness most prominent weakness in the 

left lower extremity.   





Interval History: 





Mr. Valdez is resting comfortable.  He is not in any pain.  He still has 

significant weakness.  He has been using the walker today, and needed to use 

the walker for the past one week.  He has no back pain.  He still has 

occasional cramps in the left thigh. He feels his cough is stronger today. 





He denied any impairment in his bowel or bladder functions. 








Labs: 


ESR: 9


WBC: 11


Sodium 128 - > 130


CRP: 13.98


Glucose: 113


TSH: 0.82


Vitamin B12: 424








MRI brain with and without: No acute intracranial pathology


MRI C spine with and without: multilevel spondylotic changes of the cervical 

spine as above


MRI T Spine with and without: No acute findings in the thoracic spine


MRI L spine with and without:  L2-L3 no significant canal or foraminal 

narrowing.  Multilevel spondylotic changes of the LS. There is moderate canal 

narrowing and moderate bilateral foraminal narrowing.  





Review of Systems: 


 


Denied CP, SOB, or palpitations.








Objective


Active Medications: 








Acetaminophen (Tylenol Tab*)  650 mg PO Q4H PRN


   PRN Reason: MILD PAIN or TEMP > 100.4


Cholecalciferol (Vitamin D Tab*)  1,000 units PO QAM [facility]


   Last Admin: 08/24/19 09:06 Dose:  1,000 units


Multivitamins/Minerals (Theragran/Minerals Tab*)  1 tab PO BID [facility]


   Last Admin: 08/24/19 09:07 Dose:  1 tab


Polyethylene Glycol/Electrolytes (Miralax*)  17 gm PO 0800,2100 PRN


   PRN Reason: CONSTIPATION








 Vital Signs











  08/23/19 08/23/19 08/23/19





  18:35 18:48 23:54


 


Temperature 98.8 F 97.9 F 98.3 F


 


Pulse Rate 87 96 68


 


Respiratory 16 18 16





Rate   


 


Blood Pressure 117/74 96/68 119/62





(mmHg)   


 


O2 Sat by Pulse 93 97 98





Oximetry   














  08/24/19 08/24/19 08/24/19





  03:55 07:15 07:49


 


Temperature 97.2 F 97 F 


 


Pulse Rate 77 71 


 


Respiratory 18 18 18





Rate   


 


Blood Pressure 136/48 126/65 





(mmHg)   


 


O2 Sat by Pulse 98 98 





Oximetry   














  08/24/19 08/24/19





  11:15 15:15


 


Temperature 96.8 F 97.7 F


 


Pulse Rate 74 84


 


Respiratory 16 16





Rate  


 


Blood Pressure 109/67 121/75





(mmHg)  


 


O2 Sat by Pulse 98 98





Oximetry  











Intake and Output Last 24 Hours











 08/22/19 08/23/19 08/24/19 08/25/19





 06:59 06:59 06:59 06:59


 


Intake Total   0 1200


 


Balance   0 1200


 


Weight   170 lb 11.2 oz 


 


Intake:    


 


  Oral   0 1200


 


Other:    


 


  Estimated Void   Medium 


 


  Date of Last Bowel    8/23/19





  Movement    


 


  # Bowel Movements   0 0


 


  # Voids   1 2











Oxygen Devices in Use Now: None


Neurology Exam: 


General: 





Well nourished, well developed, and in no acute distress





HEENT: Normocephelic/atraumatic, sclera anicteric, mucous membranes moist





Neck: Supple





Chest: Clear to auscultation bilaterally 





Cardiovascular: Regular rate and rhythm without murmurs, rubs, gallops





Extremities: No clubbing, cyanosis, or edema








Neurological Findings: 





Awake, alert, and oriented to person, place, and time.





Speech: fluent without dysarthria, repetition intact





Cranial Nerve: PERRL, EOM intact, VFF, no nystagmus, face symmetric bilaterally

, facial sensation intact. No clear tongue fasciculation. 





Motor: Motor (R/L):  no abnormal movements, no pronator drift. atrophy of the 

right triceps, bilateral FDI, and left quadriceps. Diffuse fasciculations seen 

in the FDI, triceps, bilateral quadriceps, and both calf muscles.  Neck 

extension 5.  Shoulder ROM is full.  Shoulder abduction 4-/4.  Elbow flexion 5/5

, extension 4/4+.  Wrist flexion 5/5, extension 4/5.  Finger abduction 4/4.   

Hip flexion 3/2, abduction 5/4.  Knee flexion 4/4-, extension 4/2.  Ankle 

dorsiflexion 5/5, plantarflexion 5/5.  Great toe extension 4/4.





 s





Reflexes  s R s L


  w


Brachioradialis  w 2+ w 2+ 


  w


Biceps  w 2+  w 2+ 


  w


Triceps  w 2+  w 2+ 


  w


Patella  w trace w 0 


  w


Ankle  w 2++  w 2++ 


  w


Plantar  w flexor  w flexor 











Sensation: intact to LT/PP bilaterally upper and lower extremities with absent 

vibration at the toes.  Intact propioception at the toes.  





Finger to nose, rapid alternating movements intact without tremor, no 

dysdiadochokinesia





Gait: wide based, waddling gait on the left.  Required a walker. 











Result Diagrams: 


 08/24/19 05:52





 08/24/19 05:52





Assessment/Plan








This is a complicated case of a very pleasant 74-year-old man with a reported 

three week history of progressive left leg weakness.  He has required to use a 

walker for the past week.  He has severe cramps in the left thigh with 

associated symptom of a weak cough.  On examination, he has diffuse mild 

fasciculation involving multiple cervical and lumbar muscles.  He has no 

sensory complaints.  He has no swallowing difficulty or respiratory complaints.

  His spouse stated that he was completely normal before August 1st, 2019. 





Imaging of the neuroaxis has not explained the etiology to his symptoms.  Most 

importantly, he has multilevel degenerative disc disease in the cervical and 

lumbar spine but not in the distribution of his weakness (for instance normal 

canal and foramina at L2-3 on the left where he is profoundly weak).    The 

lack of sensory abnormality, fairly normal nerve conduction study, and acute 

denervation potential in the L2-3 myotome suggests a motor radiculopathy.    

The differential diagnosis includes early motor neuron disease, multifocal 

motor neuropathy (less likely as there was no conduction block, although the 

left side was only checked), atypical variant of GBS, or toxic/infectious/

inflammatory/or metastatic neuropathy. 





The next plan of action is to get a lumbar puncture to check for the following: 


- Cell count and diff, gram stain, glucose, protein, cytology, Lyme, IgG. 


- I also ordered Vitamin b1, SPEP, DEONDRE, heavy metals, lead, Lyme screen, and 

anti GM and GQ antibodies. 


- He will require a repeat NCS/EMG in 2-4 weeks to check for any progression


- I will hold off on using steroids or IVIG for now until a clear cause for his 

weakness is discovered. 





Continue neuro checks every 4 hours


Discussed fall precautions with the patient and spouse at bedside.  





Time spent: 40 minutes

## 2019-08-25 LAB
ANION GAP SERPL CALC-SCNC: 4 MMOL/L (ref 2–11)
BASOPHILS # BLD AUTO: 0.1 10^3/UL (ref 0–0.2)
BUN SERPL-MCNC: 19 MG/DL (ref 6–24)
BUN/CREAT SERPL: 20.9 (ref 8–20)
CALCIUM SERPL-MCNC: 8.8 MG/DL (ref 8.6–10.3)
CHLORIDE SERPL-SCNC: 100 MMOL/L (ref 101–111)
EOSINOPHIL # BLD AUTO: 0.1 10^3/UL (ref 0–0.6)
GLUCOSE SERPL-MCNC: 97 MG/DL (ref 70–100)
HCO3 SERPL-SCNC: 29 MMOL/L (ref 22–32)
HCT VFR BLD AUTO: 42 % (ref 42–52)
HGB BLD-MCNC: 14.5 G/DL (ref 14–18)
LYMPHOCYTES # BLD AUTO: 1.3 10^3/UL (ref 1–4.8)
MCH RBC QN AUTO: 32 PG (ref 27–31)
MCHC RBC AUTO-ENTMCNC: 35 G/DL (ref 31–36)
MCV RBC AUTO: 92 FL (ref 80–94)
MONOCYTES # BLD AUTO: 1 10^3/UL (ref 0–0.8)
NEUTROPHILS # BLD AUTO: 8.3 10^3/UL (ref 1.5–7.7)
NRBC # BLD AUTO: 0 10^3/UL
NRBC BLD QL AUTO: 0
PLATELET # BLD AUTO: 259 10^3/UL (ref 150–450)
POTASSIUM SERPL-SCNC: 3.9 MMOL/L (ref 3.5–5)
RBC # BLD AUTO: 4.56 10^6 /UL (ref 4.18–5.48)
SODIUM SERPL-SCNC: 133 MMOL/L (ref 135–145)
VIT C UR QL: (no result)
WBC # BLD AUTO: 10.8 10^3/UL (ref 3.5–10.8)

## 2019-08-25 RX ADMIN — THERA TABS SCH TAB: TAB at 20:15

## 2019-08-25 RX ADMIN — DOXYCYCLINE HYCLATE SCH MG: 100 CAPSULE ORAL at 08:25

## 2019-08-25 RX ADMIN — Medication SCH UNITS: at 08:26

## 2019-08-25 RX ADMIN — ACYCLOVIR SODIUM SCH MLS/HR: 500 INJECTION, SOLUTION INTRAVENOUS at 18:35

## 2019-08-25 RX ADMIN — THERA TABS SCH TAB: TAB at 08:26

## 2019-08-25 RX ADMIN — DOXYCYCLINE HYCLATE SCH MG: 100 CAPSULE ORAL at 20:15

## 2019-08-25 RX ADMIN — ENOXAPARIN SODIUM SCH MG: 40 INJECTION SUBCUTANEOUS at 20:15

## 2019-08-25 NOTE — PN
Subjective


Interval History: 








no acute events overnight. 


Patient feeling slightly better, especially after hot shower





CSF with elevated protein 100, normal glucose 67 and 259 wbc (89% Lyphocytes, 11

% monocytes, no neutrophils). No albuminocytologic dissociation. 


Still with weak left thigh and subjectively weak cough. 





NEURO EXAM:


left hip flexion 2/5, right hip 3+/5


left knee extension 3+/5, flexion 5/5


right knee extens 4/5, flexion 5/5.


right triceps 4/5 compared to left triceps 5/5


dorsi/plantar flextion 5/5 b/l


hyperreflexive right bicep, brisk left


tone normal


no clonus or inducible clonus, slight short nystagmus right to left on 

horizontal tracking. 


CN intact


sensation to light touch and pin prick intact b/l

















Objective


Active Medications: 








Acetaminophen (Tylenol Tab*)  650 mg PO Q4H PRN


   PRN Reason: MILD PAIN or TEMP > 100.4


Cholecalciferol (Vitamin D Tab*)  1,000 units PO QAM [facility]


   Last Admin: 08/25/19 08:26 Dose:  1,000 units


Doxycycline Hyclate (Vibramycin Cap(*))  100 mg PO BID [facility]


   Last Admin: 08/25/19 08:25 Dose:  100 mg


Multivitamins/Minerals (Theragran/Minerals Tab*)  1 tab PO BID [facility]


   Last Admin: 08/25/19 08:26 Dose:  1 tab


Polyethylene Glycol/Electrolytes (Miralax*)  17 gm PO 0800,2100 PRN


   PRN Reason: CONSTIPATION








 Vital Signs - 8 hr











  08/25/19 08/25/19





  07:15 08:00


 


Temperature 98 F 


 


Pulse Rate 76 


 


Respiratory 16 18





Rate  


 


Blood Pressure 131/81 





(mmHg)  


 


O2 Sat by Pulse 99 





Oximetry  











Oxygen Devices in Use Now: None


Appearance: NAD, sitting. good spirits.


Eyes: No Scleral Icterus


Ears/Nose/Mouth/Throat: NL Teeth, Lips, Gums


Neck: NL Appearance and Movements; NL JVP, Trachea Midline


Respiratory: Symmetrical Chest Expansion and Respiratory Effort, Clear to 

Auscultation


Cardiovascular: NL Sounds; No Murmurs; No JVD, RRR


Abdominal: NL Sounds; No Tenderness; No Distention, No Hepatosplenomegaly


Extremities: No Edema


Skin: No Rash or Ulcers


Neurological: Alert and Oriented x 3, - - see above. 


Nutrition: Taking PO's


Result Diagrams: 


 08/25/19 06:50





 08/25/19 06:50


Additional Lab and Data: 





  Laboratory Results - last 24 hr











  08/24/19 08/24/19 08/25/19





  19:29 19:29 06:50


 


WBC    10.8


 


RBC    4.56


 


Hgb    14.5


 


Hct    42


 


MCV    92


 


MCH    32 H


 


MCHC    35


 


RDW    15


 


Plt Count    259


 


MPV    7.8


 


Neut % (Auto)    76.5


 


Lymph % (Auto)    12.1


 


Mono % (Auto)    9.4


 


Eos % (Auto)    1.3


 


Baso % (Auto)    0.7


 


Absolute Neuts (auto)    8.3 H


 


Absolute Lymphs (auto)    1.3


 


Absolute Monos (auto)    1.0 H


 


Absolute Eos (auto)    0.1


 


Absolute Basos (auto)    0.1


 


Absolute Nucleated RBC    0.0


 


Nucleated RBC %    0.0


 


Sodium   


 


Potassium   


 


Chloride   


 


Carbon Dioxide   


 


Anion Gap   


 


BUN   


 


Creatinine   


 


Est GFR ( Amer)   


 


Est GFR (Non-Af Amer)   


 


BUN/Creatinine Ratio   


 


Glucose   


 


Calcium   


 


Fluid Source  Cerebral spinal  


 


Fluid Volume  2.0  


 


Fluid Color  Colorless  


 


Fluid Appearance  Clear  


 


Fluid WBC  259 H*  


 


Fluid RBC  2  


 


Fluid Tot Cell Count  100  


 


Fluid Neutrophils  Not Reportable  


 


Fluid Lymphocytes  89  


 


Fluid Monocytes  11  


 


Fluid Comment    


 


CSF Cell Count Tube #  4  


 


CSF Glucose   67 


 


CSF Total Protein   100 H 


 


HIV 1&2 Ab/P24 Ag 4thGn   














  08/25/19 08/25/19





  06:50 16:57


 


WBC  


 


RBC  


 


Hgb  


 


Hct  


 


MCV  


 


MCH  


 


MCHC  


 


RDW  


 


Plt Count  


 


MPV  


 


Neut % (Auto)  


 


Lymph % (Auto)  


 


Mono % (Auto)  


 


Eos % (Auto)  


 


Baso % (Auto)  


 


Absolute Neuts (auto)  


 


Absolute Lymphs (auto)  


 


Absolute Monos (auto)  


 


Absolute Eos (auto)  


 


Absolute Basos (auto)  


 


Absolute Nucleated RBC  


 


Nucleated RBC %  


 


Sodium  133 L 


 


Potassium  3.9 


 


Chloride  100 L 


 


Carbon Dioxide  29 


 


Anion Gap  4 


 


BUN  19 


 


Creatinine  0.91 


 


Est GFR ( Amer)  98.0 


 


Est GFR (Non-Af Amer)  81.0 


 


BUN/Creatinine Ratio  20.9 H 


 


Glucose  97 


 


Calcium  8.8 


 


Fluid Source  


 


Fluid Volume  


 


Fluid Color  


 


Fluid Appearance  


 


Fluid WBC  


 


Fluid RBC  


 


Fluid Tot Cell Count  


 


Fluid Neutrophils  


 


Fluid Lymphocytes  


 


Fluid Monocytes  


 


Fluid Comment  


 


CSF Cell Count Tube #  


 


CSF Glucose  


 


CSF Total Protein  


 


HIV 1&2 Ab/P24 Ag 4thGn   Negative











Microbiology and Other Data: 








 Microbiology





08/24/19 19:29   Cerebral Spinal Fluid   CSF Gram Stain (Tube 3) - Final


08/24/19 19:29   Cerebral Spinal Fluid   CSF Culture - Preliminary


                            No Growth Day 1














Assess/Plan/Problems-Billing


Assessment: 





73 y/o M with PMH of Arthritis, L5-S1 slip(congenital), prostate cancer(s/p 

radiation), multiple basal cell carcinomas presented with left thigh weakness 

for 24 days and weakened cough for 1 week. Other muscle groups also objectively 

weak. Slight leukocytosis (since resolved) EMG with nerve conduction study done 

which was inconclusive. s/p LP with elevated protein and wbc but normal 

glucose.  Ddx includes Lyme/neuroborrelosis with radiculitis; autoimmune, 

paraneoplastic, viral encephalitis, ALS, GBS, HSV





Current deficits: L>R proximal UE, R>L triceps. hypereflexive biceps b/l, weak 

cough





f/u:


 CSF Lyme Ab -> continue doxy empirically (denies recent rash; s/p Lyme dx and 

tx ~1999)


 CSF Oligioclonal bands, IgG


 CSF Culture


 CSF HSV (of note neuro has empirically started acyvlovir), pt has no 

encephalopathy, fevers, or MRI findings. 


 [place] and [place] encephalitis panel 


 cytology


 paraneoplastic panel


 DEONDRE


 SPEP 


 Heavy Metal 


 MUSK


 ACHR Ab


 CSF VDRL


 


will add UA also. 











- Patient Problems


(1) Left leg weakness


Current Visit: Yes   Status: Acute   Code(s): R29.898 - OTH SYMPTOMS AND SIGNS 

INVOLVING THE MUSCULOSKELETAL SYSTEM   SNOMED Code(s): 125992304


   





(2) Back pain


Current Visit: Yes   Status: Acute   Code(s): M54.9 - DORSALGIA, UNSPECIFIED   

SNOMED Code(s): 130902346


   





(3) Arthritis


Current Visit: Yes   Status: Acute   Code(s): M19.90 - UNSPECIFIED 

OSTEOARTHRITIS, UNSPECIFIED SITE   SNOMED Code(s): 9372012


   





(4) DVT prophylaxis


Current Visit: Yes   Status: Acute   Code(s): Z29.9 - ENCOUNTER FOR 

PROPHYLACTIC MEASURES, UNSPECIFIED   SNOMED Code(s): 441221221


   Comment: restart lovenox 40mg (heparin had been held for LP)   





(5) Full code status


Current Visit: Yes   Status: Acute   Code(s): Z78.9 - OTHER SPECIFIED HEALTH 

STATUS   SNOMED Code(s): 362153764


   


Status and Disposition: 





medicine Inpatient, would benefit from Acute Rehab.

## 2019-08-25 NOTE — PN
Subjective


Date of Service: 08/25/19


Length of Stay: 2 Days





Neurology is following for subacute quadriparesis. 





Interval History: 


He feels good today.  Other than significant weakness in the left leg, he has 

no other complaints.  He denied any sensory abnormalities.  He denied any SOB.  

He had two bowel movements today.  He has been using the walker.  








Labs: 


ESR: 9


WBC: 10


Sodium 128 - > 130  ->133


CRP: 13.98


Glucose: 113


TSH: 0.82


Vitamin B12: 424


CSF: WBC: 259, Protein: 100, Glucose: 67, Lymphocytes: 89 








MRI brain with and without: No acute intracranial pathology


MRI C spine with and without: multilevel spondylotic changes of the cervical 

spine as above


MRI T Spine with and without: No acute findings in the thoracic spine.  

Questionable meningeal enhancement. 


MRI L spine with and without:  L2-L3 no significant canal or foraminal 

narrowing.  Multilevel spondylotic changes of the LS. There is moderate canal 

narrowing and moderate bilateral foraminal narrowing.  I reviewed the scans 

today and there seems to be some asymmetric enhancement of multiple roots in 

the L spine.  There is no enhancement of the cauda equina. 


Review of Systems: 


 


Denied CP, SOB, or palpitations.








Objective


Active Medications: 








Acetaminophen (Tylenol Tab*)  650 mg PO Q4H PRN


   PRN Reason: MILD PAIN or TEMP > 100.4


Cholecalciferol (Vitamin D Tab*)  1,000 units PO QAM [facility]


   Last Admin: 08/25/19 08:26 Dose:  1,000 units


Doxycycline Hyclate (Vibramycin Cap(*))  100 mg PO BID [facility]


   Last Admin: 08/25/19 08:25 Dose:  100 mg


Acyclovir Sodium 774.28 mg/ (Sodium Chloride)  265.4856 mls @ 0 mls/hr IVPB TID 

[facility]


Multivitamins/Minerals (Theragran/Minerals Tab*)  1 tab PO BID [facility]


   Last Admin: 08/25/19 08:26 Dose:  1 tab


Polyethylene Glycol/Electrolytes (Miralax*)  17 gm PO 0800,2100 PRN


   PRN Reason: CONSTIPATION








 Vital Signs











  08/24/19 08/24/19 08/24/19





  20:00 20:06 21:00


 


Temperature  97.6 F 97.8 F


 


Pulse Rate  84 78


 


Respiratory 18 18 18





Rate   


 


Blood Pressure  112/54 120/60





(mmHg)   


 


O2 Sat by Pulse  99 99





Oximetry   














  08/24/19 08/25/19 08/25/19





  22:00 02:00 07:15


 


Temperature 97.7 F 97.9 F 98 F


 


Pulse Rate 73 77 76


 


Respiratory 18 18 16





Rate   


 


Blood Pressure 126/58 153/72 131/81





(mmHg)   


 


O2 Sat by Pulse 98 100 99





Oximetry   














  08/25/19 08/25/19





  08:00 15:15


 


Temperature  97.6 F


 


Pulse Rate  79


 


Respiratory 18 16





Rate  


 


Blood Pressure  131/62





(mmHg)  


 


O2 Sat by Pulse  100





Oximetry  











Intake and Output Last 24 Hours











 08/23/19 08/24/19 08/25/19 08/26/19





 06:59 06:59 06:59 06:59


 


Intake Total  0 1770 1200


 


Balance  0 1770 1200


 


Weight  170 lb 11.2 oz  


 


Intake:    


 


  IV Fluids   110 


 


    ABX - CEFTRIAXONE   110 


 


  IVPB    0


 


    ABX - CEFTRIAXONE    0


 


  Oral  0 1660 1200


 


Other:    


 


  Estimated Void  Medium  


 


  Date of Last Bowel   8/23/19 8/25/19





  Movement    


 


  # Bowel Movements  0 1 2


 


  Estimated Stool Amount   Medium 


 


  # Voids  1 3 2











Oxygen Devices in Use Now: None


Neurology Exam: 


General: 





Well nourished, well developed, and in no acute distress





HEENT: Normocephelic/atraumatic, sclera anicteric, mucous membranes moist





Neck: Supple





Chest: Clear to auscultation bilaterally 





Cardiovascular: Regular rate and rhythm without murmurs, rubs, gallops





Extremities: No clubbing, cyanosis, or edema








Neurological Findings: 





Awake, alert, and oriented to person, place, and time.





Speech: fluent without dysarthria, repetition intact





Cranial Nerve: PERRL, EOM intact, VFF, no nystagmus, face symmetric bilaterally

, facial sensation intact. No clear tongue fasciculation. 





Motor: Motor (R/L):  no abnormal movements, no pronator drift. atrophy of the 

right triceps, bilateral FDI, and left quadriceps. Diffuse fasciculations seen 

in the FDI, triceps, bilateral quadriceps, and both calf muscles.  Neck 

extension 5.  Shoulder ROM is full.  Shoulder abduction 4-/4.  Elbow flexion 5/5

, extension 4/4+.  Wrist flexion 5/5, extension 4/5.  Finger abduction 4/4.   

Hip flexion 3/1, abduction 5/4, adduction 5/4.  Knee flexion 4/4-, extension 4/2

-.  Ankle dorsiflexion 5/5, plantarflexion 5/5.  Great toe extension 4/4.





 s





Reflexes  s R s L


  w


Brachioradialis  w 1+ w 2+ 


  w


Biceps  w 1+  w 2+ 


  w


Triceps  w 1 w 2+ 


  w


Patella  w trace w 0 


  w


Ankle  w 1  w 1 


  w


Plantar  w flexor  w flexor 











Sensation: intact to LT/PP bilaterally upper and lower extremities.  Vibration 

3 seconds on right and 4 seconds on left great toe.   Intact propioception at 

the toes.  





Finger to nose, rapid alternating movements intact without tremor, no 

dysdiadochokinesia





Gait: wide based, waddling gait on the left.  Required a walker. 








Result Diagrams: 


 08/25/19 06:50





 08/25/19 06:50


Additional Lab and Data: 





 Laboratory Results - last 24 hr











  08/24/19 08/24/19





  05:52 05:52


 


WBC  11.1 H 


 


RBC  4.83 


 


Hgb  14.7 


 


Hct  44 


 


MCV  91 


 


MCH  31 


 


MCHC  33 


 


RDW  14 


 


Plt Count  275 


 


MPV  7.9 


 


Neut % (Auto)  75.6 


 


Lymph % (Auto)  14.2 


 


Mono % (Auto)  8.8 


 


Eos % (Auto)  0.9 


 


Baso % (Auto)  0.5 


 


Absolute Neuts (auto)  8.4 H 


 


Absolute Lymphs (auto)  1.6 


 


Absolute Monos (auto)  1.0 H 


 


Absolute Eos (auto)  0.1 


 


Absolute Basos (auto)  0.1 


 


Absolute Nucleated RBC  0.0 


 


Nucleated RBC %  0.1 


 


Sodium   130 L


 


Potassium   3.9


 


Chloride   97 L


 


Carbon Dioxide   26


 


Anion Gap   7


 


BUN   16


 


Creatinine   0.94


 


Est GFR ( Amer)   94.4


 


Est GFR (Non-Af Amer)   78.0


 


BUN/Creatinine Ratio   17.0


 


Glucose   113 H


 


Calcium   9.0











Microbiology and Other Data: 


 Microbiology











 08/24/19 19:29 CSF Gram Stain (Tube 3) - Final





 Cerebral Spinal Fluid CSF Culture - Preliminary





    No Growth Day 1














Assessment/Plan


 





Mr. Allen Valdez is a pleasant 74-year-old man with a past medical history 

significant for prostate cancer s/p radiation therapy, currently in remission, 

Lyme disease 20 years ago where he was treated for a course of 3-4 weeks, who 

presented to [facility] with a three week history of progressive left leg weakness. He 

denied any bowel/bladder involvement and had two bowel movements today.  He did 

have pain in the low back when the symptoms started few weeks ago but now he is 

pain free.  He denied any weight loss or other bulbar symptoms.  His spouse 

stated that he was completely normal before August 1st, 2019. 





On examination, he was found to have quadriparesis with asymmetric weakness in 

the proximal and distal upper extremities (R>L), proximal weakness in the lower 

extremity L>R.  He has diffuse fasciculation, absent reflexes in the knees and 

now reduced reflexes in the ankle (was normal on admission), and a weak cough.  

He has not progressed and reports feeling better today.   





Neuroimaging did not reveal any acute-subacute pathology.  After extensively 

reviewing the images today, I think there is some enhancement of the meninges 

in the T spine and enhancement of the roots in a fat sup post-contrast sequence 

of the L spine.    The NCS was fairly unremarkable with florid denervation 

potentials in the left upper myotome and chronic reinnervation potentials in 

the cervical and lumbosacral myotomes.   CSF analysis showed a lymphocytic 

pleocytosis with elevated protein.  





Overall, on clinical grounds, and based on the current results, the 

differential diagnosis for the patient's quadraparesis includes an inflammatory/

infectious vs metastatic process.  The acute-subacute progression suggests more 

of an infectious process such as Lyme, CMV, HSV, or WNV.  





Motor neuron disease is unlikely given the temporal profile of his symptoms and 

the absence of an UMN pattern weakness.   Atypical GBS is unlikely given the 

elevated WBC in the CSF and retained ankle reflexes on exam.  Its not a 

myopathy since his CK is normal and he had denervation/reinnervation pattern 

rather than an irritable myopathic potential on EMG.  





Recommendations: 


- Pending: CSF culture, HSV PCR, viral panel, encephalitis panel, WNV, 

cytology.  I called the lab and was informed that a paper order is required for 

cytology.  Will complete and send to the lab today. 


- Pending: Lyme screen, MG panel, B1, SPEP, DEONDRE, heavy metals, lead, Lyme screen

, anti GM and GQ antibodies, HIV, copper, Ehrlichia/anaplasma


- Please review the MRIs with neuroradiology as there areas of meningeal 

enhancement in the T spine and multilevel root enhancement on the L spine.  We 

would need to confirm this with neurorad. 


- Started Acyclovir 10 mg/kg for now until the HSV PCR results. 


- Continue Doxycycline 100 mg PO twice daily.  


- Ordered FVC to check pulmonary function every 12 hours for the next 48 hours 


- Recommend consulting with Dr. Fish (ID expert)


- Consult PT to evaluate and treat 


- The patient is not in any pain at this time.  We discussed steroid therapy 

but given the lack of pain, we decided to hold off.  This can be reassessed by 

Dr. Quach tomorrow. 


- Fall precautions





I will sign out to Dr. Quach tomorrow morning who will follow with you. 





Addendum entered and electronically signed by Kiley Pham MD  08/24/19 22:53:

## 2019-08-26 RX ADMIN — ACYCLOVIR SODIUM SCH MLS/HR: 500 INJECTION, SOLUTION INTRAVENOUS at 09:09

## 2019-08-26 RX ADMIN — DOXYCYCLINE HYCLATE SCH MG: 100 CAPSULE ORAL at 09:09

## 2019-08-26 RX ADMIN — ENOXAPARIN SODIUM SCH MG: 40 INJECTION SUBCUTANEOUS at 18:13

## 2019-08-26 RX ADMIN — Medication SCH UNITS: at 09:09

## 2019-08-26 RX ADMIN — THERA TABS SCH TAB: TAB at 09:09

## 2019-08-26 RX ADMIN — ACYCLOVIR SODIUM SCH MLS/HR: 500 INJECTION, SOLUTION INTRAVENOUS at 17:02

## 2019-08-26 RX ADMIN — THERA TABS SCH TAB: TAB at 20:05

## 2019-08-26 RX ADMIN — CEFTRIAXONE SODIUM SCH MLS/HR: 1 INJECTION, POWDER, FOR SOLUTION INTRAVENOUS at 13:34

## 2019-08-26 RX ADMIN — ACYCLOVIR SODIUM SCH MLS/HR: 500 INJECTION, SOLUTION INTRAVENOUS at 01:27

## 2019-08-26 NOTE — PN
Subjective


Date of Service: 08/26/19


Interval History: 





Patient felt his left leg weakness stays the same, no new neurological sx.





He recalled today that he had a tick bite July 1st on his foot. 





Objective


Active Medications: 








Acetaminophen (Tylenol Tab*)  650 mg PO Q4H PRN


   PRN Reason: MILD PAIN or TEMP > 100.4


Cholecalciferol (Vitamin D Tab*)  1,000 units PO QAM [facility]


   Last Admin: 08/25/19 08:26 Dose:  1,000 units


Doxycycline Hyclate (Vibramycin Cap(*))  100 mg PO BID [facility]


   Last Admin: 08/25/19 20:15 Dose:  100 mg


Enoxaparin Sodium (Lovenox(*))  40 mg SUBCUT Q24H [facility]


   Last Admin: 08/25/19 20:15 Dose:  40 mg


Acyclovir Sodium 610 mg/ (Sodium Chloride)  262.2 mls @ 0 mls/hr IVPB Q8H [facility]


   Last Admin: 08/26/19 01:27 Dose:  250 mls/hr


Multivitamins/Minerals (Theragran/Minerals Tab*)  1 tab PO BID [facility]


   Last Admin: 08/25/19 20:15 Dose:  1 tab


Polyethylene Glycol/Electrolytes (Miralax*)  17 gm PO 0800,2100 PRN


   PRN Reason: CONSTIPATION








 Vital Signs - 8 hr











  08/26/19





  03:15


 


Temperature 98.5 F


 


Pulse Rate 86


 


Respiratory 18





Rate 


 


Blood Pressure 139/53





(mmHg) 


 


O2 Sat by Pulse 97





Oximetry 











Oxygen Devices in Use Now: None


Exam: 





Lying on the bed, not in distress, cheerful





Heart: normal S1 S2, no murmur


Lung: clear


Abdomen: soft non tender


Extremities: no edema


Neurological: alert and oriented x 3 


          strength: 2 for LE proximal, 4 for LE distal, all the others 5


          reflexes: areflex on left knee and ankle


          sensation: intact. 





Result Diagrams: 


 08/25/19 06:50





 08/25/19 06:50


Additional Lab and Data: 





  Laboratory Results - last 24 hr











  08/24/19 08/24/19 08/25/19





  19:29 19:29 06:50


 


WBC    10.8


 


RBC    4.56


 


Hgb    14.5


 


Hct    42


 


MCV    92


 


MCH    32 H


 


MCHC    35


 


RDW    15


 


Plt Count    259


 


MPV    7.8


 


Neut % (Auto)    76.5


 


Lymph % (Auto)    12.1


 


Mono % (Auto)    9.4


 


Eos % (Auto)    1.3


 


Baso % (Auto)    0.7


 


Absolute Neuts (auto)    8.3 H


 


Absolute Lymphs (auto)    1.3


 


Absolute Monos (auto)    1.0 H


 


Absolute Eos (auto)    0.1


 


Absolute Basos (auto)    0.1


 


Absolute Nucleated RBC    0.0


 


Nucleated RBC %    0.0


 


Sodium   


 


Potassium   


 


Chloride   


 


Carbon Dioxide   


 


Anion Gap   


 


BUN   


 


Creatinine   


 


Est GFR ( Amer)   


 


Est GFR (Non-Af Amer)   


 


BUN/Creatinine Ratio   


 


Glucose   


 


Calcium   


 


Fluid Source  Cerebral spinal  


 


Fluid Volume  2.0  


 


Fluid Color  Colorless  


 


Fluid Appearance  Clear  


 


Fluid WBC  259 H*  


 


Fluid RBC  2  


 


Fluid Tot Cell Count  100  


 


Fluid Neutrophils  Not Reportable  


 


Fluid Lymphocytes  89  


 


Fluid Monocytes  11  


 


Fluid Comment    


 


CSF Cell Count Tube #  4  


 


CSF Glucose   67 


 


CSF Total Protein   100 H 


 


HIV 1&2 Ab/P24 Ag 4thGn   














  08/25/19 08/25/19





  06:50 16:57


 


WBC  


 


RBC  


 


Hgb  


 


Hct  


 


MCV  


 


MCH  


 


MCHC  


 


RDW  


 


Plt Count  


 


MPV  


 


Neut % (Auto)  


 


Lymph % (Auto)  


 


Mono % (Auto)  


 


Eos % (Auto)  


 


Baso % (Auto)  


 


Absolute Neuts (auto)  


 


Absolute Lymphs (auto)  


 


Absolute Monos (auto)  


 


Absolute Eos (auto)  


 


Absolute Basos (auto)  


 


Absolute Nucleated RBC  


 


Nucleated RBC %  


 


Sodium  133 L 


 


Potassium  3.9 


 


Chloride  100 L 


 


Carbon Dioxide  29 


 


Anion Gap  4 


 


BUN  19 


 


Creatinine  0.91 


 


Est GFR ( Amer)  98.0 


 


Est GFR (Non-Af Amer)  81.0 


 


BUN/Creatinine Ratio  20.9 H 


 


Glucose  97 


 


Calcium  8.8 


 


Fluid Source  


 


Fluid Volume  


 


Fluid Color  


 


Fluid Appearance  


 


Fluid WBC  


 


Fluid RBC  


 


Fluid Tot Cell Count  


 


Fluid Neutrophils  


 


Fluid Lymphocytes  


 


Fluid Monocytes  


 


Fluid Comment  


 


CSF Cell Count Tube #  


 


CSF Glucose  


 


CSF Total Protein  


 


HIV 1&2 Ab/P24 Ag 4thGn   Negative











Microbiology and Other Data: 








 Microbiology





08/24/19 19:29   Cerebral Spinal Fluid   CSF Gram Stain (Tube 3) - Final


08/24/19 19:29   Cerebral Spinal Fluid   CSF Culture - Preliminary


                            No Growth Day 1














Assess/Plan/Problems-Billing


Assessment: 





73 y/o M with PMH of Arthritis, L5-S1 slip(congenital), prostate cancer(s/p 

radiation), multiple basal cell carcinomas presented with left thigh weakness 

for 24 days and weakened cough for 1 week. Other muscle groups also objectively 

weak. Slight leukocytosis (since resolved) EMG with nerve conduction study done 

which was inconclusive. s/p LP with elevated protein and wbc but normal 

glucose.  Ddx includes Lyme/neuroborrelosis with radiculitis; autoimmune, 

paraneoplastic, viral encephalitis, ALS, GBS, HSV





Current deficits: L>R proximal UE, R>L quadricept. hypereflexive biceps b/l, 

weak cough





f/u:


 CSF Lyme Ab -> continue ceftriaxone empirically (denies recent rash; s/p Lyme 

dx and tx ~1999)


 CSF Oligioclonal bands, IgG


 CSF Culture


 CSF HSV (of note neuro has empirically started acyvlovir), pt has no 

encephalopathy, fevers, or MRI findings. 


 [place] and [place] encephalitis panel 


 cytology


 paraneoplastic panel


 DEONDRE


 SPEP 


 Heavy Metal 


 MUSK


 ACHR Ab


 CSF VDRL


 














- Patient Problems


(1) Polyradiculitis


Current Visit: Yes   Status: Acute   Code(s): G62.9 - POLYNEUROPATHY, 

UNSPECIFIED   SNOMED Code(s): 966498757


   Comment: Lymphocytic pleocytosis in CSF


Likely infectious in nature at this moment, lyme disease is a concern. Awaiting 

all investigations back. 


start iv ceftriaxone for lyme coverage 


- reviewed imaging with Dr. Carrillo, no meningeal enhancement seen, due to 

venous engorgement in that area.    





(2) DVT prophylaxis


Current Visit: Yes   Status: Acute   Code(s): Z29.9 - ENCOUNTER FOR 

PROPHYLACTIC MEASURES, UNSPECIFIED   SNOMED Code(s): 293182933


   Comment: restart lovenox 40mg (heparin had been held for LP)   





(3) Full code status


Current Visit: Yes   Status: Acute   Code(s): Z78.9 - OTHER SPECIFIED HEALTH 

STATUS   SNOMED Code(s): 136543069


   


Status and Disposition: 





medicine Inpatient, would benefit from Acute Rehab. 





Attestation


Documenting Resident: Adela Dejesus


Supervising Physician: José Manuel Storey


Attestation: 


This service has been performed in part by a resident under the direction of a 

teaching physician.I, José Manuel Storey, performed the service, or was physically 

present during the critical, or key portions of the service, furnished by the 

resident. I participated in the management of the patient.

## 2019-08-26 NOTE — CONS
CONSULTATION REPORT:

 

DATE OF CONSULT:  19

 

REQUESTING PHYSICIAN:  Dr. Pham.

 

CONSULTING SERVICE:  Infectious Disease.

 

REASON FOR CONSULTATION:  Subacute left lower extremity weakness.

 

IMPRESSION:

1.  Subacute left lower extremity weakness and fasciculations detected by Dr. Pham with weakness in the right upper extremity as well.  A CSF evaluation 
shows 260 white cells, lymphocytic predominance, protein of 100, normal 
glucose.  As outlined by Dr. Pham, the infectious differential diagnosis does 
include Lyme.  He does spend a significant amount of time outdoors in this area 
and we are a few weeks out from peak nymph tick season, so I think Lyme is high 
on the differential.  Herpes viruses are also consideration, though I think 
West Nile less likely.

2.  History of prostate cancer.

 

RECOMMENDATIONS:  We will cover Lyme with ceftriaxone.  There is plan to 
continue his acyclovir awaiting the Lyme, serum antibody and HSV-PCR.

 

HISTORY OF PRESENT ILLNESS:  This is a 74-year-old man, who was well until the 
end of July.  He developed some low back pain that was band like.  He then 
developed some pain radiating through his left quad and weakness in the left 
quadriceps to the point that he started using a cane.  He was seen by his 
primary doctor, had an MRI of the spine that showed some spondylosis in the 
lumbar spine with some moderate canal stenosis.  He had been seen by spine 
specialist at [facility], who had ordered an outpatient cervical, thoracic, and 
lumbar spine MRI which was to be done this week; however, because of 
progression and persistence of his symptoms, primary recommended he come to the 
hospital.  He was seen by Dr. Pham, has had EMG that was unremarkable and 
spine MRI which may have shown some subtle enhancement in the thoracic spine.  
A lumbar puncture showed 259 white cells, 89% lymphocytes, protein of 100, 
glucose 67.  He has been started on doxycycline and acyclovir.  The workup is 
continuing.  The patient did not notice any right arm weakness.  Dr. Pham 
found that on exam.  He has been able to walk around the halls using a walker.  
He is still having some low back pain on and off and occasionally some 
quadriceps pain as well.  He does spend a lot of time outdoors in areas where 
they do see deer around the yard.  He has no signs of ticks on him.  He had 
been in [place], but only briefly and without much significant outdoors 
exposure, although the travel was to go back to [facility] not into more rural areas 
of that [facility].

 

He had had been treated for Lyme about 20 years ago.

 

PAST MEDICAL HISTORY:

1.  Prostate cancer.

2.  Arthritis.

3.  History of L5-S1 spondylosis.

4.  Status post hemorrhoidectomy.

5.  Status post right rotator cuff repair.

 

MEDICATIONS:

1.  Tylenol.

2.  Cholecalciferol.

3.  Doxycycline 100 mg by mouth twice a day.

4.  Enoxaparin.

5.  Acyclovir 600 mg IV every 8 hours.

 

ALLERGIES:  No known drug allergies.

 

FAMILY HISTORY:  Mother  at age 101 from stroke.  Father  with 
polycythemia vera in his 80s.

 

SOCIAL HISTORY:  He lives in [place].  He is a retired sociologist.  He lives 
with his wife.  He is a nonsmoker.  Travel as above.

 

REVIEW OF SYSTEMS:  All negative except as noted above to a 14-point review.

 

PHYSICAL EXAM:  Vital Signs:  Temperature 36.9, heart rate 86, respiratory rate 
18, blood pressure 139/53, oxygen saturation 97% on room air.  In general, he 
is awake, not in distress.  Neurologic:  He is oriented x3.  Follows all 
commands.  He has decreased strength in left hip flexion.  Sensation is intact 
to light touch in the upper and lower extremities bilaterally.  HEENT:  There 
is no conjunctival hemorrhage.  Oropharynx without lesions.  Neck is supple 
without mass.  Lymph Nodes:  There is no cervical, supraclavicular, inguinal, 
axillary or epitrochlear lymphadenopathy.  Heart is regular rate and rhythm 
without murmurs, rubs, or gallops.  Lungs are clear to auscultation 
bilaterally.  Abdomen:  Soft, nontender, nondistended.  There are bowel sounds 
present.  Skin:  There is no rash or splinter hemorrhage.

 

DIAGNOSTIC STUDIES/LAB DATA:  White blood cell count 10, hemoglobin 14, 
platelets 259.  Creatinine 0.9.  ALT 20.  CRP was 14.  Urinalysis; no blood or 
leukocyte esterase.  Thyroid peroxidase antibody was 0.4.  HIV antibody 
negative.

 

Please see impressions and recommendations as outlined above.

 

Thank you for asking me to see Mr. Valdez in consultation.

 

 [id number]/CPS #: [id number]

[facility]

## 2019-08-26 NOTE — CONS
NEUROLOGY CONSULT FOLLOWUP NOTE:

 

DATE OF FOLLOWUP:  08/26/19

 

LOCATION:  He is an inpatient, room 419.

 

HOSPITALIST:  Dr. Mtz.

 

CHIEF COMPLAINT:  Numbness, pain, weakness.

 

INTERVAL HISTORY:  Since yesterday, Allen feels his pain is a little bit better.
  He is more comfortable in his chair and bed than he had been.  He has not 
noticed any improvement in the weakness in his legs.  He also notes some 
numbness in his hands. He does not have any headache or neck pain.

 

I reviewed the admitting notes and went over the history with him again.  He 
first noted some pain in his back while doing some exercises in the gym.  
Within the day, he was getting pain down his left thigh and subsequently 
developed pain and weakness in both legs.  He has not noticed any weakness in 
his arms, but he has noted numbness in his hands.  There is no history of a 
tick bite.

 

MEDICATIONS:  Reviewed, he is currently on:

1.  Ceftriaxone 1 g IV q.24 hours.

2.  Acyclovir 610 mg IV q.8 hours.

3.  Vitamin D 1000 units p.o. daily.

4.  Lovenox 40 mg subcutaneous q.24 hours.

 

PHYSICAL EXAM:  On examination, he is well nourished and well hydrated.  
Temperature 98.3, blood pressure 119/54, heart rate in the 80s, respiratory 
rate is 20, and oxygen saturation is 97% on room air.  His cough is strong 
today.  He said it is better than when he came in.  He has normal facial 
strength.  Speech is clear.  On motor exam, he has normal strength in the upper 
extremities other than grade 4- right triceps weakness, grade 4+ right wrist 
extensor weakness, grade 4 right finger extensor weakness.  He has grade 4- 
dorsal interossei weakness in the right hand, grade 4+ in the left.  In lower 
the extremities, he has lessened antigravity, left hip flexor weakness and only 
grade 2 left quadriceps weakness. He has grade 4- right hip flexor weakness, 
grade 4+ right quadriceps weakness. Hamstring strength is normal bilaterally, 
as is ankle dorsiflexion strength.

 

Reflexes are intact at the knees and absent at the ankles.

 

LABORATORY DATA:  Reviewed.  He has a positive Lyme total antibody level. 
Confirmatory testing is pending.  He has a negative HIV antibody testing.  
Spinal fluid results are evaluated as well.  Multiple studies for Lyme disease 
and herpes viruses are pending.

 

IMPRESSION AND PLAN:  Impression is that of polyradicular neuropathy, most 
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  We have had quite a number of cases in the 
recent months.  Herpes virus I think is very unlikely to have such a 
disseminated polyradicular disease in an immunocompetent individual.

 

He is currently on acyclovir as well as ceftriaxone.  He was briefly on 
doxycycline.  Reviewed Dr. Vivek Ríos's note who is following him also.  
I discussed my impression with Allen and his family.  I told them I expect he 
will gradually improve if the diagnosis is correct but it may take quite a 
while to get his strength back.  I will continue to follow him along with you.

 

 [id number]/CPS #: [id number]

[facility]

## 2019-08-27 ENCOUNTER — HOSPITAL ENCOUNTER (INPATIENT)
Dept: HOSPITAL 25 - PMRU | Age: 74
LOS: 17 days | Discharge: HOME HEALTH | DRG: 869 | End: 2019-09-13
Attending: PHYSICAL MEDICINE & REHABILITATION | Admitting: PHYSICAL MEDICINE & REHABILITATION
Payer: MEDICARE

## 2019-08-27 VITALS — DIASTOLIC BLOOD PRESSURE: 70 MMHG | SYSTOLIC BLOOD PRESSURE: 132 MMHG

## 2019-08-27 DIAGNOSIS — Z92.3: ICD-10-CM

## 2019-08-27 DIAGNOSIS — R20.0: ICD-10-CM

## 2019-08-27 DIAGNOSIS — Z85.46: ICD-10-CM

## 2019-08-27 DIAGNOSIS — A69.22: Primary | ICD-10-CM

## 2019-08-27 DIAGNOSIS — Z82.3: ICD-10-CM

## 2019-08-27 DIAGNOSIS — R05: ICD-10-CM

## 2019-08-27 DIAGNOSIS — Z79.899: ICD-10-CM

## 2019-08-27 DIAGNOSIS — J34.1: ICD-10-CM

## 2019-08-27 DIAGNOSIS — M62.838: ICD-10-CM

## 2019-08-27 LAB
ALBUMIN SERPL-MCNC: 2.9 G/DL (ref 3.4–4.7)
ALBUMIN/GLOB SERPL: 0.95 {RATIO}
ANAPLASMA PHAGOCYTOPHILIUM: (no result) TITER
EHRLICHIA CHAFFEENSIS IGG AB: (no result) TITER
GAMMA GLOB SERPL ELPH-MCNC: 1.1 G/DL (ref 0.6–1.6)
OLIGOCLONAL BANDS CSF IEF: 12 BANDS (ref ?–4)
PROT SERPL-MCNC: 6 G/DL (ref 6.3–7.9)

## 2019-08-27 PROCEDURE — F08Z3ZZ FEEDING/EATING TREATMENT: ICD-10-PCS | Performed by: PHYSICAL MEDICINE & REHABILITATION

## 2019-08-27 PROCEDURE — 85025 COMPLETE CBC W/AUTO DIFF WBC: CPT

## 2019-08-27 PROCEDURE — F08Z1ZZ DRESSING TECHNIQUES TREATMENT: ICD-10-PCS | Performed by: PHYSICAL MEDICINE & REHABILITATION

## 2019-08-27 PROCEDURE — 70551 MRI BRAIN STEM W/O DYE: CPT

## 2019-08-27 PROCEDURE — 90732 PPSV23 VACC 2 YRS+ SUBQ/IM: CPT

## 2019-08-27 PROCEDURE — F07Z5ZZ BED MOBILITY TREATMENT: ICD-10-PCS | Performed by: PHYSICAL MEDICINE & REHABILITATION

## 2019-08-27 PROCEDURE — 80053 COMPREHEN METABOLIC PANEL: CPT

## 2019-08-27 PROCEDURE — F07Z8ZZ TRANSFER TRAINING TREATMENT: ICD-10-PCS | Performed by: PHYSICAL MEDICINE & REHABILITATION

## 2019-08-27 PROCEDURE — 36415 COLL VENOUS BLD VENIPUNCTURE: CPT

## 2019-08-27 PROCEDURE — F08Z0ZZ BATHING/SHOWERING TECHNIQUES TREATMENT: ICD-10-PCS | Performed by: PHYSICAL MEDICINE & REHABILITATION

## 2019-08-27 PROCEDURE — F07Z4ZZ WHEELCHAIR MOBILITY TREATMENT: ICD-10-PCS | Performed by: PHYSICAL MEDICINE & REHABILITATION

## 2019-08-27 PROCEDURE — F07Z9ZZ GAIT TRAINING/FUNCTIONAL AMBULATION TREATMENT: ICD-10-PCS | Performed by: PHYSICAL MEDICINE & REHABILITATION

## 2019-08-27 RX ADMIN — CEFTRIAXONE SODIUM SCH MLS/HR: 1 INJECTION, POWDER, FOR SOLUTION INTRAVENOUS at 11:53

## 2019-08-27 RX ADMIN — ACYCLOVIR SODIUM SCH MLS/HR: 500 INJECTION, SOLUTION INTRAVENOUS at 00:48

## 2019-08-27 RX ADMIN — Medication SCH: at 19:21

## 2019-08-27 RX ADMIN — Medication SCH UNITS: at 08:54

## 2019-08-27 RX ADMIN — ENOXAPARIN SODIUM SCH MG: 40 INJECTION SUBCUTANEOUS at 17:40

## 2019-08-27 RX ADMIN — THERA TABS SCH TAB: TAB at 08:54

## 2019-08-27 RX ADMIN — DOCUSATE SODIUM SCH MG: 100 CAPSULE, LIQUID FILLED ORAL at 19:18

## 2019-08-27 RX ADMIN — ACYCLOVIR SODIUM SCH MLS/HR: 500 INJECTION, SOLUTION INTRAVENOUS at 08:54

## 2019-08-27 NOTE — CONS
NEUROLOGY FOLLOWUP CONSULT:

 

DATE OF FOLLOWUP:  08/27/19

 

LOCATION:  He is an inpatient in room 419.

 

HOSPITALIST:  Dr. Dejesus.

 

CHIEF COMPLAINT:  Weakness, pain.

 

INTERVAL HISTORY:  Since yesterday, Mr. Valdez feels that he is better in 
regards to pain.  He still gets some pain in his back and hips, but not as 
much.  He no longer notes numbness in his hands.  He has not noticed any change 
in his leg weakness.  He has been able to ambulate around the nursing station 
with physical therapy supervision and a walker.

 

MEDICATIONS:  Reviewed and he is on:

1.  Ceftriaxone 1 g IV q.24 hours.

2.  Lovenox 40 mg subcutaneous q.24 hours.

3.  Vitamin D 1000 units p.o. q.a.m.

4.  Multivitamin 1 p.o. b.i.d.

5.  He is no longer on acyclovir.

 

PHYSICAL EXAM:  On exam, he is well nourished and well hydrated.  Temperature 
98.4, blood pressure 132/70, heart rate in the 80s.  Respiratory rate is 20 and 
oxygen saturation is 95% on room air.  His cough is a little bit weak, but 
mildly so.  He has normal facial musculature.  There is no ptosis.  Eye 
movements are full. Speech is clear.  On motor exam, he has grade 4 weakness of 
the right triceps, grade 4- right finger extensors.  He has grade 4 weakness of 
right dorsal interossei, normal on the left.  He barely has antigravity hip 
flexion strength in the right leg, he has less than antigravity hip flexion 
weakness on the left.  He is able to fully extend his knee on the right and 
exhibit grade 4 right ankle dorsiflexor weakness.  He has less than antigravity 
left knee extensor strength. He does have grade 4 left ankle dorsiflexion 
strength.

 

DIAGNOSTIC STUDIES/LAB DATA:  There are no new laboratory studies from today. 



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 
neuritis. His Western blots on serum and spinal fluid studies are pending.  I 
agree with stopping acyclovir.  He is currently planned to be discharged to the 
physical medicine and rehab unit this afternoon.  I will follow him along while 
he is there over the rest of this week with you.

 

 [id number]/CPS #: [id number]

JYOTHI

## 2019-08-27 NOTE — TRS
CC:  Dr. Irene; Dr. Brito at [facility]; Dr. Ríos; Dr. Quach *

 

TRANSFER SUMMARY:

 

DATE OF ADMISSION:  08/23/19

 

DATE OF TRANSFER:  08/27/19

 

PRIMARY DIAGNOSES:

1.  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

2.  Cerebrospinal fluid pleocytosis with negative viral and bacterial cultures 
to date.

3.  Neuritis is defined by left iliopsoas and vastus medialis muscle weakness, 
right iliopsoas weakness, and right triceps weakness.

4.  The patient also has history of pancreatic cancer.

5.  Osteoarthritis.

6.  Spondylolisthesis L5-S1, congenital.

 

MEDICATIONS ON DISCHARGE:

1.  A-REDS vitamins 1 tab p.o. b.i.d.

2.  Cholecalciferol 1000 units p.o. daily.

3.  Ceftriaxone 1 g IV q.24 to complete a 2 to 3 week course.  This is day 2 of 
ceftriaxone.

 

CONSULTATIONS:  Dr. Pham of Neurology, Dr. Ríos of Infectious Disease.

 

PROCEDURES:  Lumbar puncture on 08/25/19 that showed 259 white cells, 2 red 
cells, 89% lymphocytes, 11% monocytes.  The CSF glucose was 67 and total 
protein was 100. The CSF oligoclonal bands were abnormal at 12 with the normal 
would be up to 4. CSF VDRL is negative.  CSF albumin, IgG, slow CSF, HSV-1 and 
herpes HSV-2 are pending.  CSF Lyme also is pending.

 

Procedures also include an EEG, EMG on 08/24/19 that showed chronic multilevel 
cervical and lumbar reinnervation potentials except for severe active 
denervation with no recruitable motor potential.  In the left iliopsoas and 
partially in the left vastus medialis muscles, the right iliopsoas muscle 
showed chronic reinnervation, fasciculations were seen in multiple upper and 
lower extremity muscles.  There is no evidence of severe large fiber 
polyneuropathy.

 

HOSPITAL COURSE:  The patient was presented to the emergency department 
complaining of left leg weakness. He had initial neurologic consultation with 
Dr. Pham, who was concerned about motor neuron disease, possible heavy metal 
exposures, cervical spondylosis with myelopathy, atypical Guillain-Kila 
syndrome, or amyotrophic lateral sclerosis.  Demyelinating neuropathy was 
suspected given initial exam.  The patient was admitted to the hospital and had 
multiple imaging studies.  CT of the lumbar spine on 08/23/19 showed grade 2 
anterior spondylolisthesis at the L5-S1 level and some other moderate lumbar 
spondylotic changes.  MRI of the brain showed no acute intracranial pathology.  
MRI of cervical, thoracic, and lumbar areas showed multilevel spondylotic 
changes and the spondylolisthesis noted above, but no demyelinating or clear 
cord compression lesions.  Laboratory tests obtained during the hospital stay 
included a heavy metal screen negative for abnormal levels of arsenic cadmium, 
copper, lead, mercury, and zinc.  The patient's DEONDRE was negative. Lyme disease 
total antibody was positive and western blot is pending.  HIV is negative.  
Syphilis IgG is negative.  White count was initially 12.7, sedimentation rate 
was 9.  White count fell to 10.8 two days later.  The patient's sodium was 128 
and lashell to 133 on 2 days after admission.  Serum protein electrophoresis is 
pending.  B12 was normal.  TSH was normal.  PTH was 48.9, which was within 
normal limits.  Urinalysis was negative.  Tests pending upon discharge in 
addition to above include perineoplastic antibodies, pseudocholinesterase 
receptor binding antibodies, ehrlichia anaplasmosis screen, myasthenia gravis 
screen, general tick borne antibody panel, vitamin B1.

 

Overall, the patient was suspected to have CNS Lyme disease with particular 
inflammation of nerve roots causing the abnormal EMG nerve conduction findings. 
The patient was treated with intravenous ceftriaxone.  He had a consultation 
with Dr. Ríos on 08/26/19 where Dr. Ríos advised continued IV 
ceftriaxone, duration expected 2 to 3 weeks.  The patient also had been 
empirically treated with intravenous acyclovir for possible HSV, meningitis, 
and this was continued.

 

The patient's symptoms improved.  He initially had back pain, which largely 
resolved with antibiotics.  He remains weak as described above in both legs and 
the right arm.  He was seen in consultation with Physical Therapy and had 
assessments in the physical medicine and rehab unit.  On day of discharge, he 
was accepted at the [facility] and transferred there for continued IV ceftriaxone 
treatment and rehabilitation.  He can stop the acyclovir at the time of 
discharge as the syndrome was more consistent with Lyme disease.

 

DISPOSITION:  To [facility].

 

DIET:  Regular.

 

ACTIVITY:  As tolerated.

 

STATUS:  Inpatient.

 

CONDITION:  Stable.

 

TIME SPENT:  I spent more than 45 minutes on the day of discharge with the 
patient and resident and complete necessary paperwork.

 

 [id number]/CPS #: [id number]

JYOTHI

## 2019-08-27 NOTE — HP
ADMISSION HISTORY AND PHYSICAL:

 

DATE OF ADMISSION:  08/27/19

 

REASON FOR ADMISSION:  Lower extremity weakness secondary to Lyme, 
polyradicular neuropathy.

 

HISTORY OF ILLNESS AND HOSPITAL COURSE:  Allen Valdez is a 74-year-old male.  
He was quite active.  He has a medical history significant for prostate cancer.
  He also has a history of spondylolisthesis in his lumbar spine.  The patient 
was in his usual state of health until about 08/01/19.  He started developing 
left lower extremity weakness and pain particularly in his left leg.  The 
patient went to see Dr. Zohaib Sotelo.  He was given a steroid Dosepak.  When 
this failed to alleviate his pain, he had an MRI of his lumbar spine on 08/15/
19.  [facility] who 
felt that the pain and weakness was not coming from his back.  He went to see 
his primary care doctor on 08/23/19. He was sent to the emergency room at 
[facility].  He was admitted.  He had a CBC showing a slightly 
elevated white blood cell count and a slightly elevated CRP.  He was seen by 
Neurology.  Dr. Pham saw him and noticed he was having fasciculations.  It was 
felt that it was unclear of what was going on.  It was unclear if it was heavy 
metal exposure or Guillain-Ocilla; however, he did have reflexes.  He had 
weakness in his distal upper extremities and bilateral proximal lower 
extremities.  He had new MRIs of his cervical, thoracic and lumbar spine and 
later an EMG was done.  He had a positive Lyme antibody level.  He had a spinal 
tap.  He was started on IV antibiotics as well as IV antivirals.  Dr. Quach 
felt as the test came in, it was probably Lyme polyradicular neuritis.  He 
recommended stopping the acyclovir.  After the patient was treated with IV 
antibiotics, he did start to improve.  The patient was seen by the ID service, 
Dr. Ríos.  He remained on IV ceftriaxone, his IV acyclovir was stopped.  
Because of the weakness, he was felt to have physical therapy and occupational 
therapy needs.  He is now being admitted for inpatient rehab, so he might 
return to independent living.

 

PAST MEDICAL HISTORY:  The patient was treated for Lyme disease 20 years ago.  
As mentioned, he has a congenital listhesis in his lumbar spine.  He had a 
history of prostate cancer.

 

CURRENT MEDICATIONS:  Include:

1.  IV ceftriaxone.

2.  He has got Lovenox ordered for DVT prophylaxis.

3.  He is on Colace.

4.  Senokot.

5.  Milk of magnesia.

 

ALLERGIES:  No known drug allergies.

 

SOCIAL HISTORY:  He is a nonsmoker and nondrinker.  He lives with his wife in a 
1-story house.  There are several stone steps to enter the house.  He does have 
a daughter staying with him presently, who lives in [place].

 

REVIEW OF SYSTEMS:  The patient reports no current shortness of breath or chest 
pain.

 

                               PHYSICAL EXAMINATION

 

VITAL SIGNS:  The patient's temperature is 98.0, blood pressure is 139/64, 
pulse is 80, respirations 18.

 

HEENT:  His extraocular movements are intact.  Tongue is midline.

 

NECK:  Supple.

 

LUNGS:  Sounded clear to auscultation bilaterally.

 

HEART:  Sounds are regular.  S1 and S2 are audible.

 

ABDOMEN:  Soft and nontender.

 

EXTREMITIES:  His extremities show normal tone.

 

NEUROLOGIC:  Sensation did appear to be intact.  I did not detect any 
fasciculations.  Muscle strength is about 4+/5 in his upper extremities and 4+/
5 in his lower extremities.

 

FUNCTIONAL EXAM:  He transfers with contact guard to min assist.

 

 ASSESSMENT:  Lyme polyradicular neuritis.

 

PLAN:  We are going to integrate him into a comprehensive and therapeutic rehab 
program with the following goals:

 

1.  Physical Therapy will see the patient.  They are going to work on 
functional transfer training and ambulation training with a walker.

2.  Occupational Therapy will see the patient.  They are going to work on his 
activities of daily living including toileting and toilet transfers.

3.  Lovenox for DVT prophylaxis.

4.  Continue IV ceftriaxone for Lyme disease.

5.  His bowels will be regulated.

6.   will be closely involved to make sure that any services and 
equipment the patient requires are in place prior to discharge.

7.  Family training as appropriate.

8.  Home with appropriate services.

 

ESTIMATED LENGTH OF STAY:  7 to 10 days.

 

 

 

[id number]/CPS #: [id number]

[facility]

## 2019-08-27 NOTE — PN
Subjective


Date of Service: 08/27/19


Interval History: 





No overnight issues.





Weakness of right leg stayed the same





Lab:


Lyme antibody is positive.





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 

workup


At the meantime, he will definitely benefit from [facility]





Plan:


- consider off acyclovir after discussing with id


- transfer to [facility] for further rehab today





Discussed with ID Dr. Fish,


Suggest to take off iv acyclovir since HSV less likely. But keep IV ceftriaxone 

for now. He will review patient in PMRU and decide on further abx plan.











Objective


Active Medications: 








Acetaminophen (Tylenol Tab*)  650 mg PO Q4H PRN


   PRN Reason: MILD PAIN or TEMP > 100.4


Cholecalciferol (Vitamin D Tab*)  1,000 units PO QAM [facility]


   Last Admin: 08/27/19 08:54 Dose:  1,000 units


Enoxaparin Sodium (Lovenox(*))  40 mg SUBCUT Q24H [facility]


   Last Admin: 08/26/19 18:13 Dose:  40 mg


Acyclovir Sodium 610 mg/ (Sodium Chloride)  262.2 mls @ 0 mls/hr IVPB Q8H [facility]


   Last Admin: 08/27/19 08:54 Dose:  250 mls/hr


Ceftriaxone Sodium 1 gm/ (Sodium Chloride)  50 mls @ 100 mls/hr IVPB Q24H [facility]


   Last Admin: 08/26/19 13:34 Dose:  100 mls/hr


Multivitamins/Minerals (Theragran/Minerals Tab*)  1 tab PO BID [facility]


   Last Admin: 08/27/19 08:54 Dose:  1 tab


Polyethylene Glycol/Electrolytes (Miralax*)  17 gm PO 0800,2100 PRN


   PRN Reason: CONSTIPATION








 Vital Signs - 8 hr











  08/27/19 08/27/19 08/27/19





  03:30 07:29 08:00


 


Temperature 98.1 F 98.3 F 


 


Pulse Rate 93 80 


 


Respiratory 20 18 18





Rate   


 


Blood Pressure 151/82 134/76 





(mmHg)   


 


O2 Sat by Pulse 98 96 





Oximetry   











Oxygen Devices in Use Now: None


Exam: 





Well, sitting and talking happily with his friends.





Heart: normal S1S2, no murmur


Lung: clear


Abdomen: soft, non tender


Neurology: left thigh weakness 2/5, distal 5/5; others unremarkable.


Result Diagrams: 


 08/25/19 06:50





 08/25/19 06:50


Additional Lab and Data: 





  Laboratory Results - last 24 hr











  08/24/19 08/24/19 08/25/19





  19:29 19:29 06:50


 


WBC    10.8


 


RBC    4.56


 


Hgb    14.5


 


Hct    42


 


MCV    92


 


MCH    32 H


 


MCHC    35


 


RDW    15


 


Plt Count    259


 


MPV    7.8


 


Neut % (Auto)    76.5


 


Lymph % (Auto)    12.1


 


Mono % (Auto)    9.4


 


Eos % (Auto)    1.3


 


Baso % (Auto)    0.7


 


Absolute Neuts (auto)    8.3 H


 


Absolute Lymphs (auto)    1.3


 


Absolute Monos (auto)    1.0 H


 


Absolute Eos (auto)    0.1


 


Absolute Basos (auto)    0.1


 


Absolute Nucleated RBC    0.0


 


Nucleated RBC %    0.0


 


Sodium   


 


Potassium   


 


Chloride   


 


Carbon Dioxide   


 


Anion Gap   


 


BUN   


 


Creatinine   


 


Est GFR ( Amer)   


 


Est GFR (Non-Af Amer)   


 


BUN/Creatinine Ratio   


 


Glucose   


 


Calcium   


 


Fluid Source  Cerebral spinal  


 


Fluid Volume  2.0  


 


Fluid Color  Colorless  


 


Fluid Appearance  Clear  


 


Fluid WBC  259 H*  


 


Fluid RBC  2  


 


Fluid Tot Cell Count  100  


 


Fluid Neutrophils  Not Reportable  


 


Fluid Lymphocytes  89  


 


Fluid Monocytes  11  


 


Fluid Comment    


 


CSF Cell Count Tube #  4  


 


CSF Glucose   67 


 


CSF Total Protein   100 H 


 


HIV 1&2 Ab/P24 Ag 4thGn   














  08/25/19 08/25/19





  06:50 16:57


 


WBC  


 


RBC  


 


Hgb  


 


Hct  


 


MCV  


 


MCH  


 


MCHC  


 


RDW  


 


Plt Count  


 


MPV  


 


Neut % (Auto)  


 


Lymph % (Auto)  


 


Mono % (Auto)  


 


Eos % (Auto)  


 


Baso % (Auto)  


 


Absolute Neuts (auto)  


 


Absolute Lymphs (auto)  


 


Absolute Monos (auto)  


 


Absolute Eos (auto)  


 


Absolute Basos (auto)  


 


Absolute Nucleated RBC  


 


Nucleated RBC %  


 


Sodium  133 L 


 


Potassium  3.9 


 


Chloride  100 L 


 


Carbon Dioxide  29 


 


Anion Gap  4 


 


BUN  19 


 


Creatinine  0.91 


 


Est GFR ( Amer)  98.0 


 


Est GFR (Non-Af Amer)  81.0 


 


BUN/Creatinine Ratio  20.9 H 


 


Glucose  97 


 


Calcium  8.8 


 


Fluid Source  


 


Fluid Volume  


 


Fluid Color  


 


Fluid Appearance  


 


Fluid WBC  


 


Fluid RBC  


 


Fluid Tot Cell Count  


 


Fluid Neutrophils  


 


Fluid Lymphocytes  


 


Fluid Monocytes  


 


Fluid Comment  


 


CSF Cell Count Tube #  


 


CSF Glucose  


 


CSF Total Protein  


 


HIV 1&2 Ab/P24 Ag 4thGn   Negative











Microbiology and Other Data: 








 Microbiology





08/24/19 19:29   Cerebral Spinal Fluid   CSF Gram Stain (Tube 3) - Final


08/24/19 19:29   Cerebral Spinal Fluid   CSF Culture - Preliminary


                            No Growth Day 1














Assess/Plan/Problems-Billing


Assessment: 





75 y/o M with PMH of Arthritis, L5-S1 slip(congenital), prostate cancer(s/p 

radiation), multiple basal cell carcinomas presented with left thigh weakness 

for 24 days and weakened cough for 1 week. Other muscle groups also objectively 

weak. Slight leukocytosis (since resolved) EMG with nerve conduction study done 

which was inconclusive. s/p LP with elevated protein and wbc but normal 

glucose.  Ddx includes Lyme/neuroborrelosis with radiculitis; autoimmune, 

paraneoplastic, viral encephalitis, ALS, GBS, HSV





Current deficits: L>R proximal UE, R>L quadricept. hypereflexive biceps b/l, 

weak cough





f/u:


 CSF Lyme Ab -> continue ceftriaxone empirically (denies recent rash; s/p Lyme 

dx and tx ~1999)


 CSF Oligioclonal bands, IgG


 CSF Culture


 CSF HSV (of note neuro has empirically started acyvlovir), pt has no 

encephalopathy, fevers, or MRI findings. 


 [place] and [place] encephalitis panel 


 cytology


 paraneoplastic panel


 DEONDRE


 SPEP 


 Heavy Metal 


 MUSK


 ACHR Ab


 CSF VDRL


 














- Patient Problems


(1) Polyradiculitis


Status: Acute   Code(s): G62.9 - POLYNEUROPATHY, UNSPECIFIED   SNOMED Code(s): 

752851572


   Comment: Lymphocytic pleocytosis in CSF, lyme antibody positive.


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 Awaiting all investigations back. 


on iv ceftriaxone D2, off acyclovir.   





(2) DVT prophylaxis


Status: Acute   Code(s): Z29.9 - ENCOUNTER FOR PROPHYLACTIC MEASURES, 

UNSPECIFIED   SNOMED Code(s): 356286880


   Comment: on lovenox   





(3) Full code status


Status: Acute   Code(s): Z78.9 - OTHER SPECIFIED HEALTH STATUS   SNOMED Code(s)

: 243226448


   


Status and Disposition: 





medicine Inpatient, transfer to [facility] for further rehab





Attestation


Documenting Resident: Adela Dejesus


Supervising Physician: Raleigh Georges


Attestation: 


This service has been performed in part by a resident under the direction of a 

teaching physician.I, Raleigh Georges, performed the service, or was physically 

present during the critical, or key portions of the service, furnished by the 

resident. I participated in the management of the patient.

## 2019-08-28 LAB
ALBUMIN SERPL BCG-MCNC: 3.7 G/DL (ref 3.2–5.2)
ALBUMIN/GLOB SERPL: 1.4 {RATIO} (ref 1–3)
ALP SERPL-CCNC: 69 U/L (ref 34–104)
ALT SERPL W P-5'-P-CCNC: 29 U/L (ref 7–52)
ANION GAP SERPL CALC-SCNC: 3 MMOL/L (ref 2–11)
AST SERPL-CCNC: 22 U/L (ref 13–39)
BASOPHILS # BLD AUTO: 0.1 10^3/UL (ref 0–0.2)
BUN SERPL-MCNC: 14 MG/DL (ref 6–24)
BUN/CREAT SERPL: 15.9 (ref 8–20)
CALCIUM SERPL-MCNC: 8.9 MG/DL (ref 8.6–10.3)
CHLORIDE SERPL-SCNC: 103 MMOL/L (ref 101–111)
EOSINOPHIL # BLD AUTO: 0.2 10^3/UL (ref 0–0.6)
GLOBULIN SER CALC-MCNC: 2.7 G/DL (ref 2–4)
GLUCOSE SERPL-MCNC: 114 MG/DL (ref 70–100)
HCO3 SERPL-SCNC: 28 MMOL/L (ref 22–32)
HCT VFR BLD AUTO: 41 % (ref 42–52)
HGB BLD-MCNC: 14 G/DL (ref 14–18)
LYMPHOCYTES # BLD AUTO: 1.6 10^3/UL (ref 1–4.8)
MCH RBC QN AUTO: 31 PG (ref 27–31)
MCHC RBC AUTO-ENTMCNC: 34 G/DL (ref 31–36)
MCV RBC AUTO: 92 FL (ref 80–94)
MONOCYTES # BLD AUTO: 0.8 10^3/UL (ref 0–0.8)
MUSK AB SER-SCNC: 0 NMOL/L (ref 0–0.02)
NEUTROPHILS # BLD AUTO: 5.5 10^3/UL (ref 1.5–7.7)
NRBC # BLD AUTO: 0 10^3/UL
NRBC BLD QL AUTO: 0
PLATELET # BLD AUTO: 247 10^3/UL (ref 150–450)
POTASSIUM SERPL-SCNC: 4.2 MMOL/L (ref 3.5–5)
PROT SERPL-MCNC: 6.4 G/DL (ref 6.4–8.9)
RBC # BLD AUTO: 4.47 10^6 /UL (ref 4.18–5.48)
SODIUM SERPL-SCNC: 134 MMOL/L (ref 135–145)
WBC # BLD AUTO: 8 10^3/UL (ref 3.5–10.8)

## 2019-08-28 RX ADMIN — ENOXAPARIN SODIUM SCH MG: 40 INJECTION SUBCUTANEOUS at 17:49

## 2019-08-28 RX ADMIN — DOCUSATE SODIUM SCH: 100 CAPSULE, LIQUID FILLED ORAL at 09:25

## 2019-08-28 RX ADMIN — CEFTRIAXONE SODIUM SCH MLS/HR: 1 INJECTION, POWDER, FOR SOLUTION INTRAVENOUS at 12:09

## 2019-08-28 RX ADMIN — Medication SCH: at 20:15

## 2019-08-28 RX ADMIN — DOCUSATE SODIUM SCH: 100 CAPSULE, LIQUID FILLED ORAL at 20:27

## 2019-08-28 RX ADMIN — Medication SCH: at 09:25

## 2019-08-28 NOTE — PN
Progress Note


Date of Service: 08/28/19


Note: 


RANDY CARBONE was visited. Therapy notes read and reviewed. While he was in 

the bathroom he leaned forward to pull up his pants and had to lower himself to 

the ground on his knee while holding on to grab bar. No injury. Did well with 

therapy. No pain 








Current Medications: 


 Active Medications











Generic Name Dose Route Start Last Admin





  Trade Name Freq  PRN Reason Stop Dose Admin


 


Acetaminophen  650 mg  08/27/19 16:25  





  Tylenol Tab*  PO   





  Q6H PRN   





  MILD PAIN or TEMP > 100.4   





     





     





     


 


Docusate Sodium  100 mg  08/27/19 21:00  08/28/19 09:25





  Colace Cap*  PO   Not Given





  BID CONSUELO   





     





     





     





     


 


Enoxaparin Sodium  40 mg  08/27/19 18:00  08/28/19 17:49





  Lovenox(*)  SUBCUT   40 mg





  Q24H CONSUELO   Administration





     





     





     





     


 


Ceftriaxone Sodium 1 gm/  50 mls @ 100 mls/hr  08/28/19 12:00  08/28/19 12:09





  Sodium Chloride  IVPB   100 mls/hr





  Q24H CONSUELO   Administration





     





     





     





     


 


Magnesium Hydroxide  30 ml  08/27/19 16:25  





  Milk Of Magnesia Liq*  PO   





  Q6H PRN   





  CONSTIPATION   





     





     





     


 


Multivitamins/Minerals  1 cap  08/27/19 21:00  08/28/19 09:25





  Preservision Areds 2  PO   Not Given





  BID CONSUELO   





     





     





     





     


 


Senna  2 tab  08/27/19 16:25  





  Senokot 8.6 Mg Tab*  PO   





  BEDTIME PRN   





  CONSTIPATION   





     





     





     











Vital Signs: 


 Vital Signs











Temp Pulse Resp BP Pulse Ox


 


 98.2 F   85   16   142/62   98 


 


 08/28/19 15:50  08/28/19 15:50  08/28/19 15:50  08/28/19 15:50  08/28/19 16:47











Lab Results: 


 Laboratory Results - last 24 hr











  08/28/19 08/28/19





  05:02 05:03


 


WBC   8.0


 


RBC   4.47


 


Hgb   14.0


 


Hct   41 L


 


MCV   92


 


MCH   31


 


MCHC   34


 


RDW   15


 


Plt Count   247


 


MPV   7.9


 


Neut % (Auto)   68.2


 


Lymph % (Auto)   19.4


 


Mono % (Auto)   9.4


 


Eos % (Auto)   2.2


 


Baso % (Auto)   0.8


 


Absolute Neuts (auto)   5.5


 


Absolute Lymphs (auto)   1.6


 


Absolute Monos (auto)   0.8


 


Absolute Eos (auto)   0.2


 


Absolute Basos (auto)   0.1


 


Absolute Nucleated RBC   0.0


 


Nucleated RBC %   0.0


 


Sodium  134 L 


 


Potassium  4.2 


 


Chloride  103 


 


Carbon Dioxide  28 


 


Anion Gap  3 


 


BUN  14 


 


Creatinine  0.88 


 


Est GFR ( Amer)  101.9 


 


Est GFR (Non-Af Amer)  84.2 


 


BUN/Creatinine Ratio  15.9 


 


Glucose  114 H 


 


Calcium  8.9 


 


Total Bilirubin  0.50 


 


AST  22 


 


ALT  29 


 


Alkaline Phosphatase  69 


 


Total Protein  6.4 


 


Albumin  3.7 


 


Globulin  2.7 


 


Albumin/Globulin Ratio  1.4 











Exam: 





HEENT: EOMI


LUNGS: Clear


HEART: Regular rhythm


ABDOMEN: Soft +BS


EXTREMITIES: Normal tone


NEUROLOGIC: Sensation appears intact. Muscle strength 4+/5 in legs





Assessment/Plan: 





1. Lyme Polyradicular neuritis: Ceftriaxone. ID follow up. PT/OT


2. Back Pain: Currently pain free


3. DVT Prophylaxis: Lovenox


4. Advance Directives: Full code








08/28/19 20:03





08/28/19 20:05

## 2019-08-29 LAB
ALBUMIN SERPL-MCNC: (no result) G/DL
IGG CSF-MCNC: (no result) MG/DL
IGG SERPL-MCNC: (no result) MG/DL
IGG SYNTH RATE SER+CSF CALC-MRATE: (no result) MG/(24.H)

## 2019-08-29 RX ADMIN — ENOXAPARIN SODIUM SCH MG: 40 INJECTION SUBCUTANEOUS at 18:17

## 2019-08-29 RX ADMIN — DOCUSATE SODIUM SCH: 100 CAPSULE, LIQUID FILLED ORAL at 07:26

## 2019-08-29 RX ADMIN — Medication SCH: at 20:49

## 2019-08-29 RX ADMIN — Medication SCH: at 07:26

## 2019-08-29 RX ADMIN — DOCUSATE SODIUM SCH: 100 CAPSULE, LIQUID FILLED ORAL at 20:24

## 2019-08-29 RX ADMIN — CEFTRIAXONE SODIUM SCH MLS/HR: 1 INJECTION, POWDER, FOR SOLUTION INTRAVENOUS at 11:59

## 2019-08-29 NOTE — PN
Progress Note


Date of Service: 08/29/19


Note: 


RANDY CARBONE was visited. Therapy notes read and reviewed. He has no pain 

today and thinks he is getting stronger. Remains on IV ceftriaxone. 








Current Medications: 


 Active Medications











Generic Name Dose Route Start Last Admin





  Trade Name Freq  PRN Reason Stop Dose Admin


 


Acetaminophen  650 mg  08/27/19 16:25  





  Tylenol Tab*  PO   





  Q6H PRN   





  MILD PAIN or TEMP > 100.4   





     





     





     


 


Docusate Sodium  100 mg  08/27/19 21:00  08/29/19 07:26





  Colace Cap*  PO   Not Given





  BID CONSUELO   





     





     





     





     


 


Enoxaparin Sodium  40 mg  08/27/19 18:00  08/29/19 18:17





  Lovenox(*)  SUBCUT   40 mg





  Q24H CONSUELO   Administration





     





     





     





     


 


Ceftriaxone Sodium 1 gm/  50 mls @ 100 mls/hr  08/28/19 12:00  08/29/19 11:59





  Sodium Chloride  IVPB   100 mls/hr





  Q24H CONSUELO   Administration





     





     





     





     


 


Magnesium Hydroxide  30 ml  08/27/19 16:25  





  Milk Of Magnesia Liq*  PO   





  Q6H PRN   





  CONSTIPATION   





     





     





     


 


Multivitamins/Minerals  1 cap  08/27/19 21:00  08/29/19 07:26





  Preservision Areds 2  PO   Not Given





  BID CONSUELO   





     





     





     





     


 


Senna  2 tab  08/27/19 16:25  





  Senokot 8.6 Mg Tab*  PO   





  BEDTIME PRN   





  CONSTIPATION   





     





     





     











Vital Signs: 


 Vital Signs











Temp Pulse Resp BP Pulse Ox


 


 98.0 F   84   16   128/73   98 


 


 08/29/19 16:37  08/29/19 16:37  08/29/19 16:37  08/29/19 16:37  08/29/19 16:37











Exam: 





HEENT: EOMI


LUNGS: Clear


HEART: Regular rhythm


ABDOMEN: Soft +BS


EXTREMITIES: Normal tone


NEUROLOGIC: Sensation appears intact. Muscle strength 4+/5 in legs


Assessment/Plan: 





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 ID follow up. PT/OT


2. Back Pain: Currently pain free


3. DVT Prophylaxis: Lovenox


4. Advance Directives: Full code











08/29/19 20:07

## 2019-08-30 RX ADMIN — CEFTRIAXONE SODIUM SCH MLS/HR: 1 INJECTION, POWDER, FOR SOLUTION INTRAVENOUS at 12:03

## 2019-08-30 RX ADMIN — DOCUSATE SODIUM SCH: 100 CAPSULE, LIQUID FILLED ORAL at 20:00

## 2019-08-30 RX ADMIN — Medication SCH CAP: at 10:14

## 2019-08-30 RX ADMIN — DOCUSATE SODIUM SCH: 100 CAPSULE, LIQUID FILLED ORAL at 10:33

## 2019-08-30 RX ADMIN — ENOXAPARIN SODIUM SCH MG: 40 INJECTION SUBCUTANEOUS at 17:55

## 2019-08-30 RX ADMIN — Medication SCH UNITS: at 10:14

## 2019-08-30 RX ADMIN — Medication SCH CAP: at 20:31

## 2019-08-30 NOTE — PN
Progress Note


Date of Service: 08/30/19


Note: 


RANDY CARBONE was visited. Nursing and therapy notes read and reviewed. No 

chest pain, shortness of breath or abdominal pain.








Current Medications: 


 Active Medications











Generic Name Dose Route Start Last Admin





  Trade Name Freq  PRN Reason Stop Dose Admin


 


Acetaminophen  650 mg  08/27/19 16:25  





  Tylenol Tab*  PO   





  Q6H PRN   





  MILD PAIN or TEMP > 100.4   





     





     





     


 


Cholecalciferol  1,000 units  08/30/19 09:00  08/30/19 10:14





  Vitamin D Tab*  PO   1,000 units





  DAILY CONSUELO   Administration





     





     





     





     


 


Docusate Sodium  100 mg  08/27/19 21:00  08/30/19 10:33





  Colace Cap*  PO   Not Given





  BID CNOSUELO   





     





     





     





     


 


Enoxaparin Sodium  40 mg  08/27/19 18:00  08/29/19 18:17





  Lovenox(*)  SUBCUT   40 mg





  Q24H CONSUELO   Administration





     





     





     





     


 


Ceftriaxone Sodium 1 gm/  50 mls @ 100 mls/hr  08/28/19 12:00  08/29/19 11:59





  Sodium Chloride  IVPB   100 mls/hr





  Q24H CONSUELO   Administration





     





     





     





     


 


Magnesium Hydroxide  30 ml  08/27/19 16:25  





  Milk Of Magnesia Liq*  PO   





  Q6H PRN   





  CONSTIPATION   





     





     





     


 


Multivitamins/Minerals  1 cap  08/27/19 21:00  08/30/19 10:14





  Preservision Areds 2  PO   1 cap





  BID CONSUELO   Administration





     





     





     





     


 


Senna  2 tab  08/27/19 16:25  





  Senokot 8.6 Mg Tab*  PO   





  BEDTIME PRN   





  CONSTIPATION   





     





     





     











Vital Signs: 


 Vital Signs











Temp Pulse Resp BP Pulse Ox


 


 98.0 F   66   16   135/74   97 


 


 08/30/19 06:00  08/30/19 06:00  08/30/19 06:00  08/30/19 06:00  08/30/19 06:00











Exam: 





GEN: no acute distress. alert and appropriate


HEENT: EOMI


LUNGS: Clear to auscultation bilaterally


HEART: Regular rate and rhythm


ABDOMEN: Soft + bowel sounds, non-tender, non-distended


EXTREMITIES: no edema


NEUROLOGIC: Sensation intact x4.  Left motor 4/5 ADM, 2/5 hip flex 2/5 knee ext

; rest 5/5. 


Assessment/Plan: 





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 ID follow up. PT/OT


2. Back Pain: Currently pain free


3. DVT Prophylaxis: Lovenox


4. Advance Directives: Full code








08/30/19 11:06

## 2019-08-31 RX ADMIN — DOCUSATE SODIUM SCH: 100 CAPSULE, LIQUID FILLED ORAL at 21:02

## 2019-08-31 RX ADMIN — CEFTRIAXONE SODIUM SCH MLS/HR: 1 INJECTION, POWDER, FOR SOLUTION INTRAVENOUS at 12:46

## 2019-08-31 RX ADMIN — Medication SCH CAP: at 20:57

## 2019-08-31 RX ADMIN — Medication SCH UNITS: at 08:44

## 2019-08-31 RX ADMIN — ENOXAPARIN SODIUM SCH MG: 40 INJECTION SUBCUTANEOUS at 18:31

## 2019-08-31 RX ADMIN — Medication SCH CAP: at 08:44

## 2019-08-31 RX ADMIN — METHOCARBAMOL PRN MG: 500 TABLET ORAL at 20:58

## 2019-08-31 RX ADMIN — DOCUSATE SODIUM SCH: 100 CAPSULE, LIQUID FILLED ORAL at 08:45

## 2019-08-31 NOTE — PN
Progress Note


Date of Service: 08/31/19


Note: 


RANDY CARBONE was visited. Nursing and therapy notes read and reviewed. No 

chest pain, shortness of breath or abdominal pain. He has been getting 

uncomfortable spasms in his left quadriceps at night.  








Current Medications: 


 Active Medications











Generic Name Dose Route Start Last Admin





  Trade Name Freq  PRN Reason Stop Dose Admin


 


Acetaminophen  650 mg  08/27/19 16:25  





  Tylenol Tab*  PO   





  Q6H PRN   





  MILD PAIN or TEMP > 100.4   





     





     





     


 


Cholecalciferol  1,000 units  08/30/19 09:00  08/31/19 08:44





  Vitamin D Tab*  PO   1,000 units





  DAILY CONSUELO   Administration





     





     





     





     


 


Docusate Sodium  100 mg  08/27/19 21:00  08/31/19 08:45





  Colace Cap*  PO   Not Given





  BID CONSUELO   





     





     





     





     


 


Enoxaparin Sodium  40 mg  08/27/19 18:00  08/30/19 17:55





  Lovenox(*)  SUBCUT   40 mg





  Q24H CONSUELO   Administration





     





     





     





     


 


Ceftriaxone Sodium 1 gm/  50 mls @ 100 mls/hr  08/28/19 12:00  08/30/19 12:03





  Sodium Chloride  IVPB   100 mls/hr





  Q24H CONSUELO   Administration





     





     





     





     


 


Magnesium Hydroxide  30 ml  08/27/19 16:25  





  Milk Of Magnesia Liq*  PO   





  Q6H PRN   





  CONSTIPATION   





     





     





     


 


Multivitamins/Minerals  1 cap  08/27/19 21:00  08/31/19 08:44





  Preservision Areds 2  PO   1 cap





  BID CONSUELO   Administration





     





     





     





     


 


Senna  2 tab  08/27/19 16:25  





  Senokot 8.6 Mg Tab*  PO   





  BEDTIME PRN   





  CONSTIPATION   





     





     





     











Vital Signs: 


 Vital Signs











Temp Pulse Resp BP Pulse Ox


 


 97.9 F   80   16   149/76   94 


 


 08/31/19 05:38  08/31/19 05:38  08/31/19 08:00  08/31/19 05:38  08/31/19 08:00











Exam: 





GEN: no acute distress. alert and appropriate


LUNGS: Clear to auscultation bilaterally


HEART: Regular rate and rhythm


ABDOMEN: Soft + bowel sounds, non-tender, non-distended


EXTREMITIES: no edema


NEUROLOGIC: Sensation intact x4.  Left motor 4/5 ADM, 2/5 hip flex 2/5 knee ext

; rest 5/5.








Assessment/Plan: 





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 ID follow up. PT/OT


2. Back Pain: Currently pain free


3. DVT Prophylaxis: Lovenox


4. Advance Directives: Full code


5. Muscle spasm: will add methocarbamol prn








08/31/19 11:47

## 2019-09-01 RX ADMIN — CEFTRIAXONE SODIUM SCH MLS/HR: 1 INJECTION, POWDER, FOR SOLUTION INTRAVENOUS at 11:41

## 2019-09-01 RX ADMIN — METHOCARBAMOL PRN MG: 500 TABLET ORAL at 21:23

## 2019-09-01 RX ADMIN — Medication SCH CAP: at 21:23

## 2019-09-01 RX ADMIN — ACETAMINOPHEN PRN MG: 325 TABLET ORAL at 18:11

## 2019-09-01 RX ADMIN — DOCUSATE SODIUM SCH: 100 CAPSULE, LIQUID FILLED ORAL at 08:52

## 2019-09-01 RX ADMIN — Medication SCH UNITS: at 08:52

## 2019-09-01 RX ADMIN — Medication SCH CAP: at 08:52

## 2019-09-01 RX ADMIN — DOCUSATE SODIUM SCH: 100 CAPSULE, LIQUID FILLED ORAL at 21:28

## 2019-09-01 RX ADMIN — ENOXAPARIN SODIUM SCH MG: 40 INJECTION SUBCUTANEOUS at 18:11

## 2019-09-01 NOTE — PN
Progress Note


Date of Service: 09/01/19


Note: 


RANDY CARBONE was visited. Nursing and therapy notes read and reviewed. No 

chest pain, shortness of breath or abdominal pain. Methocarbmol helped last 

night with spasm and tolerated it well.








Current Medications: 


 Active Medications











Generic Name Dose Route Start Last Admin





  Trade Name Freq  PRN Reason Stop Dose Admin


 


Acetaminophen  650 mg  08/27/19 16:25  





  Tylenol Tab*  PO   





  Q6H PRN   





  MILD PAIN or TEMP > 100.4   





     





     





     


 


Cholecalciferol  1,000 units  08/30/19 09:00  09/01/19 08:52





  Vitamin D Tab*  PO   1,000 units





  DAILY CONSUELO   Administration





     





     





     





     


 


Docusate Sodium  100 mg  08/27/19 21:00  09/01/19 08:52





  Colace Cap*  PO   Not Given





  BID CONSUELO   





     





     





     





     


 


Enoxaparin Sodium  40 mg  08/27/19 18:00  08/31/19 18:31





  Lovenox(*)  SUBCUT   40 mg





  Q24H CONSUELO   Administration





     





     





     





     


 


Ceftriaxone Sodium 1 gm/  50 mls @ 100 mls/hr  08/28/19 12:00  08/31/19 12:46





  Sodium Chloride  IVPB   100 mls/hr





  Q24H CONSUELO   Administration





     





     





     





     


 


Magnesium Hydroxide  30 ml  08/27/19 16:25  





  Milk Of Magnesia Liq*  PO   





  Q6H PRN   





  CONSTIPATION   





     





     





     


 


Methocarbamol  750 mg  08/31/19 11:47  08/31/19 20:58





  Robaxin Tab*  PO   750 mg





  Q6H PRN   Administration





  SPASMS - MUSCLE   





     





     





     


 


Multivitamins/Minerals  1 cap  08/27/19 21:00  09/01/19 08:52





  Preservision Areds 2  PO   1 cap





  BID CONSUELO   Administration





     





     





     





     


 


Senna  2 tab  08/27/19 16:25  





  Senokot 8.6 Mg Tab*  PO   





  BEDTIME PRN   





  CONSTIPATION   





     





     





     











Vital Signs: 


 Vital Signs











Temp Pulse Resp BP Pulse Ox


 


 97.7 F   78   16   146/87   97 


 


 09/01/19 05:55  09/01/19 05:55  09/01/19 05:55  09/01/19 05:55  09/01/19 05:55











Exam: 





GEN: no acute distress. alert and appropriate


LUNGS: Clear to auscultation bilaterally


HEART: Regular rate and rhythm


ABDOMEN: Soft + bowel sounds, non-tender, non-distended


EXTREMITIES: no edema


NEUROLOGIC: Sensation intact x4.  Left motor 4/5 ADM, 2/5 hip flex 2/5 knee ext

; rest 5/5. I noted a few gross fasciculations in his left quad.








Assessment/Plan: 





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 ID follow up. PT/OT


2. Back Pain: Currently pain free


3. DVT Prophylaxis: Lovenox


4. Advance Directives: Full code


5. Muscle spasm: methocarbamol prn which he is primarily using at bedtime








09/01/19 10:39

## 2019-09-02 RX ADMIN — CEFTRIAXONE SODIUM SCH MLS/HR: 1 INJECTION, POWDER, FOR SOLUTION INTRAVENOUS at 13:45

## 2019-09-02 RX ADMIN — Medication SCH CAP: at 09:02

## 2019-09-02 RX ADMIN — METHOCARBAMOL PRN MG: 500 TABLET ORAL at 21:01

## 2019-09-02 RX ADMIN — ENOXAPARIN SODIUM SCH MG: 40 INJECTION SUBCUTANEOUS at 18:14

## 2019-09-02 RX ADMIN — Medication SCH CAP: at 21:01

## 2019-09-02 RX ADMIN — DOCUSATE SODIUM SCH MG: 100 CAPSULE, LIQUID FILLED ORAL at 21:02

## 2019-09-02 RX ADMIN — DOCUSATE SODIUM SCH: 100 CAPSULE, LIQUID FILLED ORAL at 09:01

## 2019-09-02 RX ADMIN — Medication SCH UNITS: at 09:02

## 2019-09-02 NOTE — PN
Progress Note


Date of Service: 09/02/19


Note: 


RANDY CARBONE was visited. Nursing notes read and reviewed. This morning, some 

cereal went the wrong way and he has not been able to totally clear it. His 

voice was a little raspy and he can feel it near his vocal cords. His cough has 

been weak since this illness, although improved over time. This makes is more 

difficult to clear.  No chest pain, shortness of breath or abdominal pain.








Current Medications: 


 Active Medications











Generic Name Dose Route Start Last Admin





  Trade Name Freq  PRN Reason Stop Dose Admin


 


Acetaminophen  650 mg  08/27/19 16:25  09/01/19 18:11





  Tylenol Tab*  PO   650 mg





  Q6H PRN   Administration





  MILD PAIN or TEMP > 100.4   





     





     





     


 


Cholecalciferol  1,000 units  08/30/19 09:00  09/02/19 09:02





  Vitamin D Tab*  PO   1,000 units





  DAILY CONSUELO   Administration





     





     





     





     


 


Docusate Sodium  100 mg  08/27/19 21:00  09/02/19 09:01





  Colace Cap*  PO   Not Given





  BID CONSUELO   





     





     





     





     


 


Enoxaparin Sodium  40 mg  08/27/19 18:00  09/01/19 18:11





  Lovenox(*)  SUBCUT   40 mg





  Q24H CONSUELO   Administration





     





     





     





     


 


Ceftriaxone Sodium 1 gm/  50 mls @ 100 mls/hr  08/28/19 12:00  09/01/19 11:41





  Sodium Chloride  IVPB   100 mls/hr





  Q24H CONSUELO   Administration





     





     





     





     


 


Magnesium Hydroxide  30 ml  08/27/19 16:25  





  Milk Of Magnesia Liq*  PO   





  Q6H PRN   





  CONSTIPATION   





     





     





     


 


Methocarbamol  750 mg  08/31/19 11:47  09/01/19 21:23





  Robaxin Tab*  PO   750 mg





  Q6H PRN   Administration





  SPASMS - MUSCLE   





     





     





     


 


Multivitamins/Minerals  1 cap  08/27/19 21:00  09/02/19 09:02





  Preservision Areds 2  PO   1 cap





  BID CONSUELO   Administration





     





     





     





     


 


Senna  2 tab  08/27/19 16:25  





  Senokot 8.6 Mg Tab*  PO   





  BEDTIME PRN   





  CONSTIPATION   





     





     





     











Vital Signs: 


 Vital Signs











Temp Pulse Resp BP Pulse Ox


 


 98.5 F   73   18   143/77   96 


 


 09/02/19 06:07  09/02/19 06:07  09/02/19 08:00  09/02/19 06:07  09/02/19 08:00











Exam: 





GEN: no acute distress. alert and appropriate


LUNGS: Clear to auscultation bilaterally


HEART: Regular rate and rhythm


ABDOMEN: Soft + bowel sounds, non-tender, non-distended


EXTREMITIES: no edema


NEUROLOGIC: Sensation intact x4.  Left motor 4/5 ADM, 2/5 hip flex 2/5 knee ext

; rest 5/5. 











Assessment/Plan: 





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 ID follow up. PT/

OT.  There was a note left on the front of the chart about Dr. Pham ordering 

cytology and flow cytometry on paper forms. I spoke to Dr. Pham who said this 

was done on the acute service last week and to disregard.


2. Back Pain: Currently pain free


3. DVT Prophylaxis: Lovenox


4. Advance Directives: Full code


5. Muscle spasm: methocarbamol prn which he is primarily using at bedtime


6. Weak cough: Will add abdominal binder during the day and as needed. In 

instructed him in bracing his abdomen for coughing/quad cough.





09/02/19 10:11

## 2019-09-03 LAB — VGKC AB SER-SCNC: 0 NMOL/L (ref ?–0.02)

## 2019-09-03 RX ADMIN — METHOCARBAMOL PRN MG: 500 TABLET ORAL at 20:24

## 2019-09-03 RX ADMIN — DOCUSATE SODIUM SCH: 100 CAPSULE, LIQUID FILLED ORAL at 07:50

## 2019-09-03 RX ADMIN — DOCUSATE SODIUM SCH: 100 CAPSULE, LIQUID FILLED ORAL at 20:18

## 2019-09-03 RX ADMIN — METHOCARBAMOL PRN MG: 500 TABLET ORAL at 13:48

## 2019-09-03 RX ADMIN — CEFTRIAXONE SODIUM SCH MLS/HR: 1 INJECTION, POWDER, FOR SOLUTION INTRAVENOUS at 12:45

## 2019-09-03 RX ADMIN — Medication SCH CAP: at 07:50

## 2019-09-03 RX ADMIN — Medication SCH CAP: at 20:23

## 2019-09-03 RX ADMIN — Medication SCH UNITS: at 07:50

## 2019-09-03 RX ADMIN — ENOXAPARIN SODIUM SCH MG: 40 INJECTION SUBCUTANEOUS at 17:59

## 2019-09-03 NOTE — PMRUTEAM
PMRU: Team Meeting


Current Status: 


 Nursing: Current Status











Skin Deviations [Lower         Other





Posterior Back]                


 


Skin Deviation Description [   none





none]                          


 


Skin Deviation Description [   nothing visualized





Lower Posterior Back]          











 Physical Therapy: Current Status











Bed Mobility Assistance        Supervision


 


Transfer Mobility Assistance   Contact Guard Assist


 


Transfer/Bed Mobility          Rolling Walker





Recommended Devices            


 


Ambulation Assistance          Contact Guard Assist


 


Ambulation Assistive Devices   Rolling Walker


 


Number of Feet Patient         2x100





Ambulated                      


 


Stairs Assistance              Not Tested


 


Number of Stairs               12


 


Manual Wheelchair Control/     Bilateral LE's





Technique                      


 


Wheelchair Propulsion Ability  Standby Assistance


 


Wheelchair Distance (ft)       120


 


Objective Comments             patient woeks on propelling W/C with BLE works on





 attaining end range knee extension in LLE with





 every step.














 Occupational Therapy: Current Status











Upper Body Dressing            Supervision


 


Lower Body Dressing            Supervision


 


Bathing                        Supervision


 


Toileting                      Supervision


 


Toilet Transfer                Contact Guard Assist


 


Shower Transfer                Contact Guard Assist


 


Eating                         Independent














 Rec Therapy: Current Status











Summary of Assessment and      Recreation Therapy assessment complete and





Clinical Impression            services have been introduced.  Pt. has been





 engaged in leisure activities, social with





 visitors and during leisure sessions.  Pt. has





 also been engaged in pet therapy.


 


Treatment Goals                Pt. will engage in leisure activities while on 

the





 unit.


 


Treatment Plan                 Provide recreation therapy services and encourage





 involvement.














 Nutrition: Current Status











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





 disease. Nutrition assessment planned 9/3 per [facility]





 protocol.  He is receiving a regular diet and is





 eating well per documentation.  No difficulty





 chewing or swallowing.  Labs 8/28 unremarkable.





 Initial goals as outlined below.














Goals: 


 Physical Therapy: Initial Goals











Bed Mobility Assistance        Independent


 


Transfer Mobility Assistance   Independent


 


Transfer/Bed Mobility          Rolling Walker





Recommended Devices            


 


Ambulation                     Independent


 


Ambulation Recommended Devices Rolling Walker


 


Ambulation Distance            150


 


Stairs Assistance              Independent


 


Stair Recommended Devices      Two Rails


 


Number of Stairs               10














 Physical Therapy: Updated Goals











Transfer/Bed Mobility          Rolling Walker





Recommended Devices            














 Occupational Therapy: Initial Goals











Goals to be Completed in (Days 7





)                              


 


Upper Body Bathing Routine     Modified Independent with


 


Lower Body Bathing Routine     Modified Independent with


 


Upper Body Dressing Routine    Independent


 


Lower Body Dressing Routine    Modified Independent with


 


Toilet Hygeine and Clothing    Modified Independent with





Management Routine             


 


Toilet Transfer Routine        Modified Independent with


 


Tub Transfer Routine           Modified Independent with


 


Functional Transfers for ADL   Modified Independent with


 


Grooming Routine               Modified Independent with


 


Feeding Routine                Independent














 Nutrition: Goals











Intervention Goals             1.  adequate intake to support stable wt,





 hydration, and lean body mass





 2.  maintain serum electrolytes WNL





 3.  regulation of bowel pattern; no c/o





 constipation (or diarrhea)














Care Plan: 


 Care Plan





ADL's - Improve/Maintain                Start:  08/28/19 02:51              


Freq:   DAILY@0700,1900                 Status: Active      Target: 08/29/19


Protocol:                                                                   








Activity Type Activity Date Activity User E-Sign Co-Sign Detail





Recorded Client Recorded Date Recorded By   


 


Document 08/29/19 15:53 [id number]   





[id number]-C09 08/29/19 15:53 [id number]   














  08/29/19





  15:53


 


PMRU Outcome: ADL's/ADL Transfers 


 


Orders/Interventions Occupational





 Therapy





 Evaluation &





 Treatment





 Communication





 Tool in Patient





 Room


 


Device Yes


 


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





 * Lyme dz?


 


Patient to receive OT 5x/wk for  Therex





min/day Self Care





 Management





 Group Therapy


 


UE/LE ADL's with Assist Yes: Divya


 


ADL Transfers with Assist Yes: Divya


 


Toileting: Transfers,Clothing Management Yes: Divya





,Hygeine w/Assist 


 


Light Kitchen/Laundry w/Assist Yes: Divya


 


Other Outcome/Goals Pt is





 participatory





 in therapy and





 shows





 improvement in





 edurance: pt





 was able to





 walk to shower





 room and back





 without





 requiring w/c.





 Pt also reports





 no longer





 requiring





 assistance with





 peeling back





 lids and





 opening





 packaging





 during meals.


 


Progression Toward Outcome/Goals Progressing








Discharge Planning - Improve/Maintain   Start:  08/28/19 02:51              


Freq:   DAILY@0700,1900                 Status: Active      Target: 09/06/19


Protocol:                                                                   








Activity Type Activity Date Activity User E-Sign Co-Sign Detail





Recorded Client Recorded Date Recorded By   


 


Document 09/03/19 08:03 [id number]   





[id number]-C03 09/03/19 08:04 [id number]   














  09/03/19





  08:03


 


PMRU Outcome: Discharge Planning 


 


Update Patient Family No


 


Current Discharge Planning Outcome/Goals Demonstrates





 Understanding





 of Discharge





 Plan


 


Progression Toward Outcome/Goals Progressing








Education-Improve/Maintain              Start:  08/27/19 15:08              


Freq:   DAILY@0700,1900                 Status: Active      Target: 09/06/19


Protocol:                                                                   








Activity Type Activity Date Activity User E-Sign Co-Sign Detail





Recorded Client Recorded Date Recorded By   


 


Document 09/03/19 08:03 [id number]   





PMRU-C03 09/03/19 08:04 [id number]   














  09/03/19





  08:03


 


PMRU Outcome: Education 


 


Current Education Outcome/Goals Demonstrates





 Skills





 Encourage





 Questions


 


Progression Toward Outcome/Goals Progressing








/GI-Improve/Maintain                  Start:  08/28/19 02:51              


Freq:   DAILY@0700,1900                 Status: Complete    Target: 09/04/19


Protocol:                                                                   








Activity Type Activity Date Activity User E-Sign Co-Sign Detail





Recorded Client Recorded Date Recorded By   


 


Document 08/28/19 16:43 [id number]   





[id number]-C07 08/28/19 16:47 [id number]   














  08/28/19





  16:43


 


PMRU Outcome: Genitourinary/ 





Gastrointestinal 


 


Current Gastrointestinal Outcome/Goals Maintain/





 Achieve Bowel





 Regularity in





 Accordance with





 Pt's Baseline





 Remain Free of





 Emesis





 Prevent





 Constipation





 Laxatives as





 Ordered


 


Progression Toward Outcome/Goals Goals Met


 


Outcome/Goals Met Remain Free of





 Emesis





 Prevent





 Constipation


 


Current Genitourinary Outcome/Goals Maintain/





 Achieve Urinary





 Continence





 Remain Free of





 Hospital-





 Acquired UTI


 


Progression Toward Outcome/Goals Goals Met


 


Outcome/Goals Met Maintain/





 Achieve Urinary





 Continence





 Remain Free of





 Hospital-





 Acquired UTI








Medication Administration               Start:  08/28/19 02:51              


Freq:   DAILY@0700,1900                 Status: Active      Target: 09/06/19


Protocol:                                                                   








Activity Type Activity Date Activity User E-Sign Co-Sign Detail





Recorded Client Recorded Date Recorded By   


 


Document 09/03/19 08:03 [id number]   





PMRU-C03 09/03/19 08:04 [id number]   














  09/03/19





  08:03


 


PMRU Outcome: Medication Administration 


 


Assess Patient Knowledge/Teach Med Yes





Education for all Meds 


 


Current Med Admin Outcome/Goals Patient





 Independent





 with Medication





 Administration





 at Home





 Demonstrates





 Understanding





 Demonstrates





 Lovenox





 Administration


 


Progression Towards Outcome/Goals Progressing


 


Is Patient Going Home on Lovenox? Yes


 


If Patient is Going Home on Lovenox, Who patient





Will Administer 








Mobility- Improve/Maintain              Start:  08/27/19 15:08              


Freq:   DAILY@0700,1900                 Status: Active      Target: 09/18/19


Protocol:                                                                   








Activity Type Activity Date Activity User E-Sign Co-Sign Detail





Recorded Client Recorded Date Recorded By   


 


Document 08/29/19 16:56 [id number]   





[id number] 08/29/19 16:56 [id number]   














  08/29/19





  16:56


 


PMRU Outcome: Mobility 


 


Physical Therapy Evaluation and Yes





Treatment 


 


Activity OOB with Assistance Yes


 


WBAT Yes


 


Device Yes


 


Assistance Yes


 


Patient to be seen 5x/wk for  min/ Therex





day for: Mobility





 Training





 Gait Training





 W/C Mobility





 Balance


 


Current Mobility Outcome/Goals Maintain/





 Achieve





 Baseline





 Mobility Status





 Improve





 Mobility Status





 Demonstrates





 Proper Use of





 Assistive





 Devices





 Free from





 Complications





 of Immobility


 


Progression Toward Outcome/Goals Progressing


 


Bed Mobility Yes:





 independent


 


Transfers Yes:





 independent





 with rolling





 walker.


 


Gait x ft Yes:





 independent





 with rolling





 walker 150'


 


Up/Down Stairs Yes:





 independent up/





 down 10 stairs





 with 2 rails.








Neurological- Improve/Maintain          Start:  08/27/19 15:08              


Freq:   DAILY@0700,1900                 Status: Active      Target: 09/06/19


Protocol:                                                                   








Activity Type Activity Date Activity User E-Sign Co-Sign Detail





Recorded Client Recorded Date Recorded By   


 


Document 09/03/19 08:03 [id number]   





PMRU-C03 09/03/19 08:04 [id number]   














  09/03/19





  08:03


 


PMRU Outcome: Neurological 


 


Weakness/Aphasia Weakness





 Left Side


 


Current Neurological Outcome/Goals Improve





 Neurological





 Status





 Maintain/





 Improve





 Strength/ROM


 


Progression Toward Outcome/Goals Progressing








Rec Therapy- Improve/Maintain           Start:  08/27/19 16:54              


Freq:   DAILY@0700,1900                 Status: Active      Target: 09/10/19


Protocol:                                                                   








Activity Type Activity Date Activity User E-Sign Co-Sign Detail





Recorded Client Recorded Date Recorded By   


 


Document 08/28/19 16:43 [id number]   





BSU-C04 08/28/19 16:44 [id number]   














  08/28/19





  16:43


 


PMRU Outcome: Recreation Therapy 


 


Current Rec Ther Outcome/Goals Meet with





 Patient





 Regularly for





 Support





 Encourage





 Leisure





 Involvement


 


Progression Toward Outcome/Goals Progressing


 


Lack of Progression Comment pt. continues





 to welcome





 leisure visits,





 has been





 engaged in





 leisure





 activities





 independently





 as well.








Respiratory - Improve/Maintain          Start:  08/27/19 15:17              


Freq:   DAILY@0700,1900                 Status: Complete    Target: 09/02/19


Protocol:                                                                   








Activity Type Activity Date Activity User E-Sign Co-Sign Detail





Recorded Client Recorded Date Recorded By   


 


Document 08/28/19 16:43 [id number]   





[id number]-C07 08/28/19 16:47 [id number]   














  08/28/19





  16:43


 


PMRU Outcome: Respiratory 


 


Does Patient Have a Trach No


 


Current Respiratory Outcome/Goals Maintain/





 Improve





 Baseline





 Respiratory





 Status





 Maintain/





 Improve





 Activity





 Tolerance





 Prevent





 Pneumonia/





 Atelectasis


 


Progression Toward Outcome/Goals Goals Met


 


Outcome/Goals Met Maintain/





 Improve





 Activity





 Tolerance








Safety- Improve/Maintain                Start:  08/27/19 14:09              


Freq:   DAILY@1362,6591                 Status: Active      Target: 09/06/19


Protocol:                                                                   








Activity Type Activity Date Activity User E-Sign Co-Sign Detail





Recorded Client Recorded Date Recorded By   


 


Document 09/03/19 08:03 [id number]   





[id number]-C03 09/03/19 08:04 [id number]   














  09/03/19





  08:03


 


PMRU Outcome: Safety 


 


Current Safety Outcome/Goals Remain Free of





 Injury or Harm





 Cooperates with





 Safety





 Measures for





 Least





 Restrictive





 Environment





 Prevent Falls/





 Injury


 


Progression Toward Outcome/Goals Progressing














Medicine Note: 








Length of Stay:  10 days





Anticipated Discharge Destination: 





Tentative Discharge Date:  09/13/19





Discharged to:  Home

## 2019-09-03 NOTE — PN
Progress Note


Date of Service: 09/03/19


Note: 


RANDY CARBONE was visited. Therapy notes read and reviewed. He was discussed 

in interdisciplinary team rounds. He is having proximal muscle weakness. Has a 

weak cough








Current Medications: 


 Active Medications











Generic Name Dose Route Start Last Admin





  Trade Name Freq  PRN Reason Stop Dose Admin


 


Acetaminophen  650 mg  08/27/19 16:25  09/01/19 18:11





  Tylenol Tab*  PO   650 mg





  Q6H PRN   Administration





  MILD PAIN or TEMP > 100.4   





     





     





     


 


Cholecalciferol  1,000 units  08/30/19 09:00  09/03/19 07:50





  Vitamin D Tab*  PO   1,000 units





  DAILY CONSUELO   Administration





     





     





     





     


 


Docusate Sodium  100 mg  08/27/19 21:00  09/03/19 07:50





  Colace Cap*  PO   Not Given





  BID CONSUELO   





     





     





     





     


 


Enoxaparin Sodium  40 mg  08/27/19 18:00  09/03/19 17:59





  Lovenox(*)  SUBCUT   40 mg





  Q24H CONSUELO   Administration





     





     





     





     


 


Ceftriaxone Sodium 1 gm/  50 mls @ 100 mls/hr  08/28/19 12:00  09/03/19 12:45





  Sodium Chloride  IVPB   100 mls/hr





  Q24H CONSUELO   Administration





     





     





     





     


 


Magnesium Hydroxide  30 ml  08/27/19 16:25  





  Milk Of Magnesia Liq*  PO   





  Q6H PRN   





  CONSTIPATION   





     





     





     


 


Methocarbamol  750 mg  08/31/19 11:47  09/03/19 13:48





  Robaxin Tab*  PO   750 mg





  Q6H PRN   Administration





  SPASMS - MUSCLE   





     





     





     


 


Multivitamins/Minerals  1 cap  08/27/19 21:00  09/03/19 07:50





  Preservision Areds 2  PO   1 cap





  BID CONSUELO   Administration





     





     





     





     


 


Senna  2 tab  08/27/19 16:25  





  Senokot 8.6 Mg Tab*  PO   





  BEDTIME PRN   





  CONSTIPATION   





     





     





     











Vital Signs: 


 Vital Signs











Temp Pulse Resp BP Pulse Ox


 


 98.2 F   85   16   142/78   95 


 


 09/03/19 15:45  09/03/19 15:45  09/03/19 19:12  09/03/19 15:45  09/03/19 19:12











Exam: 





GENERAL: no acute distress. alert and appropriate


LUNGS: Clear to auscultation bilaterally


HEART: Regular rate and rhythm


ABDOMEN: Soft + bowel sounds, non-tender, non-distended


EXTREMITIES: no edema


NEUROLOGIC: Sensation intact x4.  Left motor 4/5 ADM, 2/5 hip flex 2/5 knee ext

; rest 5/5. 


Assessment/Plan: 





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 ID follow up. PT/

OT.  


2. Back Pain: Currently pain free


3. DVT Prophylaxis: Lovenox


4. Advance Directives: Full code


5. Muscle spasm: methocarbamol prn which he is primarily using at bedtime


6. Weak cough: Abdominal binder during the day and as needed. In instructed him 

in bracing his abdomen for coughing/quad cough.











09/03/19 19:17

## 2019-09-04 LAB
ALBUMIN SERPL BCG-MCNC: 3.5 G/DL (ref 3.2–5.2)
ALBUMIN/GLOB SERPL: 1.4 {RATIO} (ref 1–3)
ALP SERPL-CCNC: 65 U/L (ref 34–104)
ALT SERPL W P-5'-P-CCNC: 37 U/L (ref 7–52)
ANION GAP SERPL CALC-SCNC: 4 MMOL/L (ref 2–11)
AST SERPL-CCNC: 23 U/L (ref 13–39)
BASOPHILS # BLD AUTO: 0 10^3/UL (ref 0–0.2)
BUN SERPL-MCNC: 14 MG/DL (ref 6–24)
BUN/CREAT SERPL: 15.6 (ref 8–20)
CALCIUM SERPL-MCNC: 8.6 MG/DL (ref 8.6–10.3)
CHLORIDE SERPL-SCNC: 105 MMOL/L (ref 101–111)
EOSINOPHIL # BLD AUTO: 0.1 10^3/UL (ref 0–0.6)
GLOBULIN SER CALC-MCNC: 2.5 G/DL (ref 2–4)
GLUCOSE SERPL-MCNC: 108 MG/DL (ref 70–100)
HCO3 SERPL-SCNC: 27 MMOL/L (ref 22–32)
HCT VFR BLD AUTO: 41 % (ref 42–52)
HGB BLD-MCNC: 14.2 G/DL (ref 14–18)
LYMPHOCYTES # BLD AUTO: 1.8 10^3/UL (ref 1–4.8)
MCH RBC QN AUTO: 32 PG (ref 27–31)
MCHC RBC AUTO-ENTMCNC: 35 G/DL (ref 31–36)
MCV RBC AUTO: 91 FL (ref 80–94)
MONOCYTES # BLD AUTO: 0.6 10^3/UL (ref 0–0.8)
NEUTROPHILS # BLD AUTO: 4.1 10^3/UL (ref 1.5–7.7)
NRBC # BLD AUTO: 0 10^3/UL
NRBC BLD QL AUTO: 0.1
PLATELET # BLD AUTO: 293 10^3/UL (ref 150–450)
POTASSIUM SERPL-SCNC: 4.1 MMOL/L (ref 3.5–5)
PROT SERPL-MCNC: 6 G/DL (ref 6.4–8.9)
RBC # BLD AUTO: 4.5 10^6 /UL (ref 4.18–5.48)
SODIUM SERPL-SCNC: 136 MMOL/L (ref 135–145)
WBC # BLD AUTO: 6.8 10^3/UL (ref 3.5–10.8)

## 2019-09-04 RX ADMIN — CEFTRIAXONE SODIUM SCH MLS/HR: 1 INJECTION, POWDER, FOR SOLUTION INTRAVENOUS at 12:40

## 2019-09-04 RX ADMIN — METHOCARBAMOL PRN MG: 500 TABLET ORAL at 20:47

## 2019-09-04 RX ADMIN — Medication SCH CAP: at 20:47

## 2019-09-04 RX ADMIN — DOCUSATE SODIUM SCH: 100 CAPSULE, LIQUID FILLED ORAL at 20:08

## 2019-09-04 RX ADMIN — Medication SCH UNITS: at 09:23

## 2019-09-04 RX ADMIN — DOCUSATE SODIUM SCH: 100 CAPSULE, LIQUID FILLED ORAL at 09:24

## 2019-09-04 RX ADMIN — ENOXAPARIN SODIUM SCH MG: 40 INJECTION SUBCUTANEOUS at 18:15

## 2019-09-04 RX ADMIN — Medication SCH CAP: at 09:23

## 2019-09-04 NOTE — PN
Progress Note


Date of Service: 09/04/19


Note: 


RANDY CARBONE was visited. Therapy notes read and reviewed. He feels like he 

is getting stronger. He remains on IV ceftriaxone. Otherwise he is stable








Current Medications: 


 Active Medications











Generic Name Dose Route Start Last Admin





  Trade Name Freq  PRN Reason Stop Dose Admin


 


Acetaminophen  650 mg  08/27/19 16:25  09/01/19 18:11





  Tylenol Tab*  PO   650 mg





  Q6H PRN   Administration





  MILD PAIN or TEMP > 100.4   





     





     





     


 


Cholecalciferol  1,000 units  08/30/19 09:00  09/04/19 09:23





  Vitamin D Tab*  PO   1,000 units





  DAILY CONSUELO   Administration





     





     





     





     


 


Docusate Sodium  100 mg  08/27/19 21:00  09/04/19 20:08





  Colace Cap*  PO   Not Given





  BID OCNSUELO   





     





     





     





     


 


Enoxaparin Sodium  40 mg  08/27/19 18:00  09/04/19 18:15





  Lovenox(*)  SUBCUT   40 mg





  Q24H CONSUELO   Administration





     





     





     





     


 


Ceftriaxone Sodium 1 gm/  50 mls @ 100 mls/hr  08/28/19 12:00  09/04/19 12:40





  Sodium Chloride  IVPB   100 mls/hr





  Q24H CONSUELO   Administration





     





     





     





     


 


Magnesium Hydroxide  30 ml  08/27/19 16:25  





  Milk Of Magnesia Liq*  PO   





  Q6H PRN   





  CONSTIPATION   





     





     





     


 


Methocarbamol  750 mg  08/31/19 11:47  09/03/19 20:24





  Robaxin Tab*  PO   750 mg





  Q6H PRN   Administration





  SPASMS - MUSCLE   





     





     





     


 


Multivitamins/Minerals  1 cap  08/27/19 21:00  09/04/19 09:23





  Preservision Areds 2  PO   1 cap





  BID CONSUELO   Administration





     





     





     





     


 


Senna  2 tab  08/27/19 16:25  





  Senokot 8.6 Mg Tab*  PO   





  BEDTIME PRN   





  CONSTIPATION   





     





     





     











Vital Signs: 


 Vital Signs











Temp Pulse Resp BP Pulse Ox


 


 98.0 F   81   18   129/70   99 


 


 09/04/19 16:17  09/04/19 16:17  09/04/19 16:17  09/04/19 16:17  09/04/19 19:15











Lab Results: 


 Laboratory Results - last 24 hr











  09/04/19 09/04/19





  04:32 04:32


 


WBC  6.8 


 


RBC  4.50 


 


Hgb  14.2 


 


Hct  41 L 


 


MCV  91 


 


MCH  32 H 


 


MCHC  35 


 


RDW  15 


 


Plt Count  293 


 


MPV  7.6 


 


Neut % (Auto)  61.4 


 


Lymph % (Auto)  26.6 


 


Mono % (Auto)  9.5 


 


Eos % (Auto)  1.8 


 


Baso % (Auto)  0.7 


 


Absolute Neuts (auto)  4.1 


 


Absolute Lymphs (auto)  1.8 


 


Absolute Monos (auto)  0.6 


 


Absolute Eos (auto)  0.1 


 


Absolute Basos (auto)  0.0 


 


Absolute Nucleated RBC  0.0 


 


Nucleated RBC %  0.1 


 


Sodium   136


 


Potassium   4.1


 


Chloride   105


 


Carbon Dioxide   27


 


Anion Gap   4


 


BUN   14


 


Creatinine   0.90


 


Est GFR ( Amer)   99.3


 


Est GFR (Non-Af Amer)   82.0


 


BUN/Creatinine Ratio   15.6


 


Glucose   108 H


 


Calcium   8.6


 


Total Bilirubin   0.50


 


AST   23


 


ALT   37


 


Alkaline Phosphatase   65


 


Total Protein   6.0 L


 


Albumin   3.5


 


Globulin   2.5


 


Albumin/Globulin Ratio   1.4











Exam: 





GENERAL: no acute distress. alert and appropriate


LUNGS: Clear to auscultation bilaterally


HEART: Regular rate and rhythm


ABDOMEN: Soft + bowel sounds, non-tender, non-distended


EXTREMITIES: no edema


NEUROLOGIC: Sensation intact x4.  Left motor 4/5 ADM, 3/5 hip flex 3/5 knee ext

; rest 5/5. 


Assessment/Plan: 





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 ID follow up. PT/

OT.  


2. Back Pain: Currently pain free


3. DVT Prophylaxis: Lovenox


4. Advance Directives: Full code


5. Muscle spasm: methocarbamol prn which he is primarily using at bedtime


6. Weak cough: Abdominal binder during the day and as needed. In instructed him 

in bracing his abdomen for coughing/quad cough.

















09/04/19 20:10

## 2019-09-05 RX ADMIN — CEFTRIAXONE SODIUM SCH MLS/HR: 1 INJECTION, POWDER, FOR SOLUTION INTRAVENOUS at 11:49

## 2019-09-05 RX ADMIN — METHOCARBAMOL PRN MG: 500 TABLET ORAL at 21:01

## 2019-09-05 RX ADMIN — Medication SCH CAP: at 21:01

## 2019-09-05 RX ADMIN — Medication SCH CAP: at 08:10

## 2019-09-05 RX ADMIN — ENOXAPARIN SODIUM SCH MG: 40 INJECTION SUBCUTANEOUS at 17:40

## 2019-09-05 RX ADMIN — Medication SCH UNITS: at 08:10

## 2019-09-05 RX ADMIN — DOCUSATE SODIUM SCH: 100 CAPSULE, LIQUID FILLED ORAL at 08:07

## 2019-09-05 RX ADMIN — DOCUSATE SODIUM SCH: 100 CAPSULE, LIQUID FILLED ORAL at 21:29

## 2019-09-05 NOTE — PN
Progress Note


Date of Service: 09/05/19


Note: 


RANDY CARBONE was visited. Therapy notes read and reviewed. Appreciate ID 

note. He will finish Ceftriaxone after tomorrow's dose and then go on PO Doxy 

for 2 weeks. He feels like he is getting stronger. 








Current Medications: 


 Active Medications











Generic Name Dose Route Start Last Admin





  Trade Name Freq  PRN Reason Stop Dose Admin


 


Acetaminophen  650 mg  08/27/19 16:25  09/01/19 18:11





  Tylenol Tab*  PO   650 mg





  Q6H PRN   Administration





  MILD PAIN or TEMP > 100.4   





     





     





     


 


Cholecalciferol  1,000 units  08/30/19 09:00  09/05/19 08:10





  Vitamin D Tab*  PO   1,000 units





  DAILY CONSUELO   Administration





     





     





     





     


 


Docusate Sodium  100 mg  08/27/19 21:00  09/05/19 08:07





  Colace Cap*  PO   Not Given





  BID CONSUELO   





     





     





     





     


 


Doxycycline Hyclate  100 mg  09/07/19 07:30  





  Vibramycin Cap(*)  PO  09/21/19 07:29  





  Q12H CONSUELO   





     





     





     





     


 


Enoxaparin Sodium  40 mg  08/27/19 18:00  09/05/19 17:40





  Lovenox(*)  SUBCUT   40 mg





  Q24H CONSUELO   Administration





     





     





     





     


 


Ceftriaxone Sodium 1 gm/  50 mls @ 100 mls/hr  08/28/19 12:00  09/05/19 11:49





  Sodium Chloride  IVPB  09/06/19 13:00  100 mls/hr





  Q24H CONSUELO   Administration





     





     





     





     


 


Magnesium Hydroxide  30 ml  08/27/19 16:25  





  Milk Of Magnesia Liq*  PO   





  Q6H PRN   





  CONSTIPATION   





     





     





     


 


Methocarbamol  750 mg  08/31/19 11:47  09/04/19 20:47





  Robaxin Tab*  PO   750 mg





  Q6H PRN   Administration





  SPASMS - MUSCLE   





     





     





     


 


Multivitamins/Minerals  1 cap  08/27/19 21:00  09/05/19 08:10





  Preservision Areds 2  PO   1 cap





  BID CONSUELO   Administration





     





     





     





     


 


Senna  2 tab  08/27/19 16:25  





  Senokot 8.6 Mg Tab*  PO   





  BEDTIME PRN   





  CONSTIPATION   





     





     





     











Vital Signs: 


 Vital Signs











Temp Pulse Resp BP Pulse Ox


 


 97.8 F   79   16   131/73   99 


 


 09/05/19 15:25  09/05/19 15:25  09/05/19 19:31  09/05/19 15:25  09/05/19 19:31











Exam: 





GENERAL: no acute distress. alert and appropriate


LUNGS: Clear to auscultation bilaterally


HEART: Regular rate and rhythm


ABDOMEN: Soft + bowel sounds, non-tender, non-distended


EXTREMITIES: no edema


NEUROLOGIC: Sensation intact x4.  Left motor 4/5 ADM, 3/5 hip flex 3/5 knee ext

; rest 5/5. 


Assessment/Plan: 





1. Lyme Polyradicular neuritis: IV Ceftriaxone 1 gm Q 24 H through 9/6, then 

doxy 100 BID for 2 weeks. ID follow up. PT/OT.  


2. Back Pain: Currently pain free


3. DVT Prophylaxis: Lovenox


4. Advance Directives: Full code


5. Muscle spasm: methocarbamol prn which he is primarily using at bedtime


6. Weak cough: Abdominal binder during the day and as needed. In instructed him 

in bracing his abdomen for coughing/quad cough.























09/05/19 21:04

## 2019-09-05 NOTE — PN
Progress Note





- Progress Note


Date of Service: 09/05/19


SOAP: 


Subjective:


CC: Lyme disease





HPI: Mr. Mccain is a 75 yo male with PMH significant for prostate cancer and 

osteoarthritis; who presented to the hospital with lower extremity weakness and 

was discharge to acute rehab for Lyme disease with polyradicular neuritis. 

Denies fever, chills, shortness of breath, nausea, vomiting, diarrhea, joint 

pain, muscles pain, numbness or tingling. He reports nocturia, but denies other 

urinary symptoms. He isn't sleeping well for the past few days due to the 

nocturia. Otherwise he is felling well. He is anticipating discharge home on 9/ 13.





Objective:


 Vital Signs











  09/05/19





  05:37


 


Temperature 97.6 F


 


Pulse Rate 74


 


Respiratory 18





Rate 


 


Blood Pressure 157/82





(mmHg) 


 


Blood Pressure 107





Mean 


 


O2 Sat by Pulse 97





Oximetry 


 


Patient on Room Yes





Air 





Physical Exam:


General: NAD, sitting up in a chair


Neurological: Alert and Oriented x4


HEENT: Moist MM, no thrush


Cardiovascular: Heart rate regular


Respiratory: Lung sounds clear bilateral


Abdominal: Bowel sounds present; ABD soft, non tender and non distended


Skin: No rash





 Laboratory Last Values











WBC  6.8 10^3/uL (3.5-10.8)   09/04/19  04:32    


 


RBC  4.50 10^6 /uL (4.18-5.48)   09/04/19  04:32    


 


Hgb  14.2 g/dL (14.0-18.0)   09/04/19  04:32    


 


Hct  41 % (42-52)  L  09/04/19  04:32    


 


MCV  91 fL (80-94)   09/04/19  04:32    


 


MCH  32 pg (27-31)  H  09/04/19  04:32    


 


MCHC  35 g/dL (31-36)   09/04/19  04:32    


 


RDW  15 % (10-15)   09/04/19  04:32    


 


Plt Count  293 10^3/uL (150-450)   09/04/19  04:32    


 


MPV  7.6 fL (7.4-10.4)   09/04/19  04:32    


 


Neut % (Auto)  61.4 %  09/04/19  04:32    


 


Lymph % (Auto)  26.6 %  09/04/19  04:32    


 


Mono % (Auto)  9.5 %  09/04/19  04:32    


 


Eos % (Auto)  1.8 %  09/04/19  04:32    


 


Baso % (Auto)  0.7 %  09/04/19  04:32    


 


Absolute Neuts (auto)  4.1 10^3/ul (1.5-7.7)   09/04/19  04:32    


 


Absolute Lymphs (auto)  1.8 10^3/ul (1.0-4.8)   09/04/19  04:32    


 


Absolute Monos (auto)  0.6 10^3/ul (0-0.8)   09/04/19  04:32    


 


Absolute Eos (auto)  0.1 10^3/ul (0-0.6)   09/04/19  04:32    


 


Absolute Basos (auto)  0.0 10^3/ul (0-0.2)   09/04/19  04:32    


 


Absolute Nucleated RBC  0.0 10^3/ul  09/04/19  04:32    


 


Nucleated RBC %  0.1   09/04/19  04:32    


 


Sodium  136 mmol/L (135-145)   09/04/19  04:32    


 


Potassium  4.1 mmol/L (3.5-5.0)   09/04/19  04:32    


 


Chloride  105 mmol/L (101-111)   09/04/19  04:32    


 


Carbon Dioxide  27 mmol/L (22-32)   09/04/19  04:32    


 


Anion Gap  4 mmol/L (2-11)   09/04/19  04:32    


 


BUN  14 mg/dL (6-24)   09/04/19  04:32    


 


Creatinine  0.90 mg/dL (0.67-1.17)   09/04/19  04:32    


 


Est GFR ( Amer)  99.3  (>60)   09/04/19  04:32    


 


Est GFR (Non-Af Amer)  82.0  (>60)   09/04/19  04:32    


 


BUN/Creatinine Ratio  15.6  (8-20)   09/04/19  04:32    


 


Glucose  108 mg/dL ()  H  09/04/19  04:32    


 


Calcium  8.6 mg/dL (8.6-10.3)   09/04/19  04:32    


 


Total Bilirubin  0.50 mg/dL (0.2-1.0)   09/04/19  04:32    


 


AST  23 U/L (13-39)   09/04/19  04:32    


 


ALT  37 U/L (7-52)   09/04/19  04:32    


 


Alkaline Phosphatase  65 U/L ()   09/04/19  04:32    


 


Total Protein  6.0 g/dL (6.4-8.9)  L  09/04/19  04:32    


 


Albumin  3.5 g/dL (3.2-5.2)   09/04/19  04:32    


 


Globulin  2.5 g/dL (2-4)   09/04/19  04:32    


 


Albumin/Globulin Ratio  1.4  (1-3)   09/04/19  04:32    








Assessment:


1. Lyme disease with polyradicular neuritis. Weakness is improving and he is 

feeling well. 





Plan:


Continue IV ceftriaxone for 1 more day; day 13/14. On Saturday 9/7 start 

Doxycycline 100 mg PO BID for 14 days to complete a 28 day course of 

antibiotics. 





35 minutes floor time: > 50% face to face counseling with patient regarding 

antibiotic treatment and side effects. He will call the office for fever, chills

, rash or diarrhea.

## 2019-09-06 RX ADMIN — Medication SCH CAP: at 10:16

## 2019-09-06 RX ADMIN — DOCUSATE SODIUM SCH: 100 CAPSULE, LIQUID FILLED ORAL at 10:15

## 2019-09-06 RX ADMIN — Medication SCH CAP: at 20:25

## 2019-09-06 RX ADMIN — DOCUSATE SODIUM SCH MG: 100 CAPSULE, LIQUID FILLED ORAL at 20:23

## 2019-09-06 RX ADMIN — ENOXAPARIN SODIUM SCH MG: 40 INJECTION SUBCUTANEOUS at 18:13

## 2019-09-06 RX ADMIN — Medication SCH UNITS: at 10:15

## 2019-09-06 RX ADMIN — CEFTRIAXONE SODIUM SCH MLS/HR: 1 INJECTION, POWDER, FOR SOLUTION INTRAVENOUS at 14:18

## 2019-09-06 NOTE — PN
Progress Note


Date of Service: 09/06/19


Note: 


RANDY CARBONE was visited. Therapy notes read and reviewed. He is starting to 

stairs and admits it takes concentration. He is finished with IV Ceftriaxone. 

Doxycycline tomorrow. 








Current Medications: 


 Active Medications











Generic Name Dose Route Start Last Admin





  Trade Name Freq  PRN Reason Stop Dose Admin


 


Acetaminophen  650 mg  08/27/19 16:25  09/01/19 18:11





  Tylenol Tab*  PO   650 mg





  Q6H PRN   Administration





  MILD PAIN or TEMP > 100.4   





     





     





     


 


Cholecalciferol  1,000 units  08/30/19 09:00  09/06/19 10:15





  Vitamin D Tab*  PO   1,000 units





  DAILY CONSUELO   Administration





     





     





     





     


 


Docusate Sodium  100 mg  08/27/19 21:00  09/06/19 10:15





  Colace Cap*  PO   Not Given





  BID CONSUELO   





     





     





     





     


 


Doxycycline Hyclate  100 mg  09/07/19 07:30  





  Vibramycin Cap(*)  PO  09/21/19 07:29  





  Q12H CONSUELO   





     





     





     





     


 


Enoxaparin Sodium  40 mg  08/27/19 18:00  09/06/19 18:13





  Lovenox(*)  SUBCUT   40 mg





  Q24H CONSUELO   Administration





     





     





     





     


 


Magnesium Hydroxide  30 ml  08/27/19 16:25  





  Milk Of Magnesia Liq*  PO   





  Q6H PRN   





  CONSTIPATION   





     





     





     


 


Methocarbamol  750 mg  08/31/19 11:47  09/05/19 21:01





  Robaxin Tab*  PO   750 mg





  Q6H PRN   Administration





  SPASMS - MUSCLE   





     





     





     


 


Multivitamins/Minerals  1 cap  08/27/19 21:00  09/06/19 10:16





  Preservision Areds 2  PO   1 cap





  BID CONSUELO   Administration





     





     





     





     


 


Senna  2 tab  08/27/19 16:25  





  Senokot 8.6 Mg Tab*  PO   





  BEDTIME PRN   





  CONSTIPATION   





     





     





     











Vital Signs: 


 Vital Signs











Temp Pulse Resp BP Pulse Ox


 


 97.7 F   75   16   126/76   99 


 


 09/06/19 15:09 09/06/19 15:09 09/06/19 15:09 09/06/19 15:09 09/06/19 15:09











Exam: 





GENERAL: no acute distress. alert and appropriate


LUNGS: Clear to auscultation bilaterally


HEART: Regular rate and rhythm


ABDOMEN: Soft + bowel sounds, non-tender, non-distended


EXTREMITIES: no edema


NEUROLOGIC: Sensation intact x4.  Left motor 4/5 ADM, 3/5 hip flex 3/5 knee ext

; rest 5/5. 


Assessment/Plan: 





1. Lyme Polyradicular neuritis: IV Ceftriaxone 1 gm Q 24 H last dose today, 

doxy 100 BID for 2 weeks starting tomorrow. ID follow up. PT/OT.  


2. Back Pain: Currently pain free


3. DVT Prophylaxis: Lovenox


4. Advance Directives: Full code


5. Muscle spasm: methocarbamol prn which he is primarily using at bedtime


6. Weak cough: Abdominal binder during the day and as needed. In instructed him 

in bracing his abdomen for coughing/quad cough.


























09/06/19 18:57

## 2019-09-07 RX ADMIN — DOXYCYCLINE HYCLATE SCH MG: 100 CAPSULE ORAL at 19:39

## 2019-09-07 RX ADMIN — METHOCARBAMOL PRN MG: 500 TABLET ORAL at 02:49

## 2019-09-07 RX ADMIN — ENOXAPARIN SODIUM SCH MG: 40 INJECTION SUBCUTANEOUS at 18:11

## 2019-09-07 RX ADMIN — DOXYCYCLINE HYCLATE SCH MG: 100 CAPSULE ORAL at 06:22

## 2019-09-07 RX ADMIN — Medication SCH CAP: at 20:23

## 2019-09-07 RX ADMIN — Medication SCH UNITS: at 09:27

## 2019-09-07 RX ADMIN — DOCUSATE SODIUM SCH MG: 100 CAPSULE, LIQUID FILLED ORAL at 09:27

## 2019-09-07 RX ADMIN — Medication SCH CAP: at 09:27

## 2019-09-07 RX ADMIN — DOCUSATE SODIUM SCH MG: 100 CAPSULE, LIQUID FILLED ORAL at 20:23

## 2019-09-07 RX ADMIN — METHOCARBAMOL PRN MG: 500 TABLET ORAL at 20:23

## 2019-09-07 NOTE — PN
Progress Note


Date of Service: 09/07/19


Note: 


RANDY CARBONE was visited. Therapy notes read and reviewed. He has no 

complaints. Will d/c peripheral IV as he is done with IV ceftriaxone








Current Medications: 


 Active Medications











Generic Name Dose Route Start Last Admin





  Trade Name Freq  PRN Reason Stop Dose Admin


 


Acetaminophen  650 mg  08/27/19 16:25  09/01/19 18:11





  Tylenol Tab*  PO   650 mg





  Q6H PRN   Administration





  MILD PAIN or TEMP > 100.4   





     





     





     


 


Cholecalciferol  1,000 units  08/30/19 09:00  09/07/19 09:27





  Vitamin D Tab*  PO   1,000 units





  DAILY CONSUELO   Administration





     





     





     





     


 


Docusate Sodium  100 mg  08/27/19 21:00  09/07/19 09:27





  Colace Cap*  PO   100 mg





  BID CONSUELO   Administration





     





     





     





     


 


Doxycycline Hyclate  100 mg  09/07/19 07:30  09/07/19 06:22





  Vibramycin Cap(*)  PO  09/21/19 07:29  100 mg





  Q12H CONSUELO   Administration





     





     





     





     


 


Enoxaparin Sodium  40 mg  08/27/19 18:00  09/06/19 18:13





  Lovenox(*)  SUBCUT   40 mg





  Q24H CONSUELO   Administration





     





     





     





     


 


Magnesium Hydroxide  30 ml  08/27/19 16:25  





  Milk Of Magnesia Liq*  PO   





  Q6H PRN   





  CONSTIPATION   





     





     





     


 


Methocarbamol  750 mg  08/31/19 11:47  09/07/19 02:49





  Robaxin Tab*  PO   750 mg





  Q6H PRN   Administration





  SPASMS - MUSCLE   





     





     





     


 


Multivitamins/Minerals  1 cap  08/27/19 21:00  09/07/19 09:27





  Preservision Areds 2  PO   1 cap





  BID CONSUELO   Administration





     





     





     





     


 


Senna  2 tab  08/27/19 16:25  





  Senokot 8.6 Mg Tab*  PO   





  BEDTIME PRN   





  CONSTIPATION   





     





     





     











Vital Signs: 


 Vital Signs











Temp Pulse Resp BP Pulse Ox


 


 97.8 F   74   16   142/83   96 


 


 09/07/19 04:38  09/07/19 04:38  09/07/19 04:38  09/07/19 04:38  09/07/19 04:38











Exam: 





GENERAL: no acute distress. alert and appropriate


LUNGS: Clear to auscultation bilaterally


HEART: Regular rate and rhythm


ABDOMEN: Soft + bowel sounds, non-tender, non-distended


EXTREMITIES: no edema


NEUROLOGIC: Sensation intact x4.  Left motor 4/5 ADM, 3/5 hip flex 3/5 knee ext

; rest 5/5. 


Assessment/Plan: 





1. Lyme Polyradicular neuritis: doxy 100 BID day 1/14. ID follow up. PT/OT.  


2. Back Pain: Currently pain free


3. DVT Prophylaxis: Lovenox


4. Advance Directives: Full code


5. Muscle spasm: methocarbamol prn which he is primarily using at bedtime


6. Weak cough: Abdominal binder during the day and as needed. In instructed him 

in bracing his abdomen for coughing/quad cough.





























09/07/19 11:54

## 2019-09-08 RX ADMIN — Medication SCH CAP: at 20:21

## 2019-09-08 RX ADMIN — Medication SCH UNITS: at 09:17

## 2019-09-08 RX ADMIN — ENOXAPARIN SODIUM SCH MG: 40 INJECTION SUBCUTANEOUS at 18:12

## 2019-09-08 RX ADMIN — METHOCARBAMOL PRN MG: 500 TABLET ORAL at 20:21

## 2019-09-08 RX ADMIN — Medication SCH TAB: at 20:21

## 2019-09-08 RX ADMIN — DOCUSATE SODIUM SCH MG: 100 CAPSULE, LIQUID FILLED ORAL at 20:21

## 2019-09-08 RX ADMIN — Medication SCH CAP: at 09:18

## 2019-09-08 RX ADMIN — DOXYCYCLINE HYCLATE SCH MG: 100 CAPSULE ORAL at 19:30

## 2019-09-08 RX ADMIN — DOXYCYCLINE HYCLATE SCH MG: 100 CAPSULE ORAL at 07:20

## 2019-09-08 RX ADMIN — DOCUSATE SODIUM SCH MG: 100 CAPSULE, LIQUID FILLED ORAL at 09:17

## 2019-09-08 NOTE — PN
Progress Note


Date of Service: 09/08/19


Note: 


RANDY CARBONE was visited. Nursing notes read and reviewed. He had nausea 

after his am dose of doxy. Will order Zofran PRN








Current Medications: 


 Active Medications











Generic Name Dose Route Start Last Admin





  Trade Name Freq  PRN Reason Stop Dose Admin


 


Acetaminophen  650 mg  08/27/19 16:25  09/01/19 18:11





  Tylenol Tab*  PO   650 mg





  Q6H PRN   Administration





  MILD PAIN or TEMP > 100.4   





     





     





     


 


Cholecalciferol  1,000 units  08/30/19 09:00  09/08/19 09:17





  Vitamin D Tab*  PO   1,000 units





  DAILY CONSUELO   Administration





     





     





     





     


 


Docusate Sodium  100 mg  08/27/19 21:00  09/08/19 09:17





  Colace Cap*  PO   100 mg





  BID CONSUELO   Administration





     





     





     





     


 


Doxycycline Hyclate  100 mg  09/07/19 07:30  09/08/19 07:20





  Vibramycin Cap(*)  PO  09/21/19 07:29  100 mg





  Q12H CONSUELO   Administration





     





     





     





     


 


Enoxaparin Sodium  40 mg  08/27/19 18:00  09/07/19 18:11





  Lovenox(*)  SUBCUT   40 mg





  Q24H CONSUELO   Administration





     





     





     





     


 


Magnesium Hydroxide  30 ml  08/27/19 16:25  





  Milk Of Magnesia Liq*  PO   





  Q6H PRN   





  CONSTIPATION   





     





     





     


 


Methocarbamol  750 mg  08/31/19 11:47  09/07/19 20:23





  Robaxin Tab*  PO   750 mg





  Q6H PRN   Administration





  SPASMS - MUSCLE   





     





     





     


 


Multivitamins/Minerals  1 cap  08/27/19 21:00  09/08/19 09:18





  Preservision Areds 2  PO   1 cap





  BID CONSUELO   Administration





     





     





     





     


 


Senna  2 tab  08/27/19 16:25  





  Senokot 8.6 Mg Tab*  PO   





  BEDTIME PRN   





  CONSTIPATION   





     





     





     











Vital Signs: 


 Vital Signs











Temp Pulse Resp BP Pulse Ox


 


 98.4 F   89   18   143/85   97 


 


 09/08/19 05:49  09/08/19 05:49  09/08/19 07:24  09/08/19 05:49  09/08/19 05:49











Exam: 





GENERAL: no acute distress. alert and appropriate


LUNGS: Clear to auscultation bilaterally


HEART: Regular rate and rhythm


ABDOMEN: Soft + bowel sounds, non-tender, non-distended


EXTREMITIES: no edema


NEUROLOGIC: Sensation intact x4.  Left motor 4/5 ADM, 3/5 hip flex 3/5 knee ext

; rest 5/5. 


Assessment/Plan: 





1. 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 ID follow up. PT/OT.  


2. Back Pain: Currently pain free


3. DVT Prophylaxis: Lovenox


4. Advance Directives: Full code


5. Muscle spasm: methocarbamol prn which he is primarily using at bedtime


6. Weak cough: Abdominal binder during the day and as needed. In instructed him 

in bracing his abdomen for coughing/quad cough.





























09/08/19 09:54

## 2019-09-09 RX ADMIN — DOCUSATE SODIUM SCH MG: 100 CAPSULE, LIQUID FILLED ORAL at 10:39

## 2019-09-09 RX ADMIN — ENOXAPARIN SODIUM SCH MG: 40 INJECTION SUBCUTANEOUS at 18:11

## 2019-09-09 RX ADMIN — DOXYCYCLINE HYCLATE SCH MG: 100 CAPSULE ORAL at 20:05

## 2019-09-09 RX ADMIN — Medication SCH CAP: at 10:39

## 2019-09-09 RX ADMIN — METHOCARBAMOL PRN MG: 500 TABLET ORAL at 20:06

## 2019-09-09 RX ADMIN — DOXYCYCLINE HYCLATE SCH MG: 100 CAPSULE ORAL at 06:39

## 2019-09-09 RX ADMIN — Medication SCH UNITS: at 10:39

## 2019-09-09 RX ADMIN — Medication SCH CAP: at 20:05

## 2019-09-09 RX ADMIN — Medication SCH TAB: at 10:39

## 2019-09-09 RX ADMIN — Medication SCH TAB: at 20:05

## 2019-09-09 RX ADMIN — DOCUSATE SODIUM SCH MG: 100 CAPSULE, LIQUID FILLED ORAL at 20:05

## 2019-09-09 NOTE — PN
Progress Note


Date of Service: 09/09/19


Note: 


RANDY CARBONE was visited. Therapy notes read and reviewed. Moving a little 

better but would like his proximal legs to be a little stronger. Otherwise he 

is doing fine








Current Medications: 


 Active Medications











Generic Name Dose Route Start Last Admin





  Trade Name Freq  PRN Reason Stop Dose Admin


 


Acetaminophen  650 mg  08/27/19 16:25  09/01/19 18:11





  Tylenol Tab*  PO   650 mg





  Q6H PRN   Administration





  MILD PAIN or TEMP > 100.4   





     





     





     


 


Cholecalciferol  1,000 units  08/30/19 09:00  09/09/19 10:39





  Vitamin D Tab*  PO   1,000 units





  DAILY CONSUELO   Administration





     





     





     





     


 


Docusate Sodium  100 mg  08/27/19 21:00  09/09/19 10:39





  Colace Cap*  PO   100 mg





  BID CONSUELO   Administration





     





     





     





     


 


Doxycycline Hyclate  100 mg  09/07/19 07:30  09/09/19 06:39





  Vibramycin Cap(*)  PO  09/21/19 07:29  100 mg





  Q12H CONSUELO   Administration





     





     





     





     


 


Enoxaparin Sodium  40 mg  08/27/19 18:00  09/09/19 18:11





  Lovenox(*)  SUBCUT   40 mg





  Q24H CONSUELO   Administration





     





     





     





     


 


Lactobacillus Rhamnosus  1 tab  09/08/19 21:00  09/09/19 10:39





  Lactobacillus Acidophilus*  PO   1 tab





  BID CONSUELO   Administration





     





     





     





     


 


Magnesium Hydroxide  30 ml  08/27/19 16:25  





  Milk Of Magnesia Liq*  PO   





  Q6H PRN   





  CONSTIPATION   





     





     





     


 


Methocarbamol  750 mg  08/31/19 11:47  09/08/19 20:21





  Robaxin Tab*  PO   750 mg





  Q6H PRN   Administration





  SPASMS - MUSCLE   





     





     





     


 


Multivitamins/Minerals  1 cap  08/27/19 21:00  09/09/19 10:39





  Preservision Areds 2  PO   1 cap





  BID CONSUELO   Administration





     





     





     





     


 


Ondansetron HCl  4 mg  09/08/19 09:54  





  Zofran Odt Tab*  PO   





  Q6H PRN   





  NAUSEA   





     





     





     


 


Senna  2 tab  08/27/19 16:25  





  Senokot 8.6 Mg Tab*  PO   





  BEDTIME PRN   





  CONSTIPATION   





     





     





     











Vital Signs: 


 Vital Signs











Temp Pulse Resp BP Pulse Ox


 


 97.8 F   81   18   132/68   97 


 


 09/09/19 16:54  09/09/19 16:54  09/09/19 16:54  09/09/19 16:54  09/09/19 16:54











Exam: 





GENERAL: no acute distress. alert and appropriate


LUNGS: Clear to auscultation bilaterally


HEART: Regular rate and rhythm


ABDOMEN: Soft + bowel sounds, non-tender, non-distended


EXTREMITIES: no edema


NEUROLOGIC: Sensation intact x4.  Left motor 4/5 ADM, 3/5 hip flex 3/5 knee ext

; rest 5/5. 


Assessment/Plan: 





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 ID follow up. PT/OT.  


2. Back Pain: Currently pain free


3. DVT Prophylaxis: Lovenox


4. Advance Directives: Full code


5. Muscle spasm: methocarbamol prn which he is primarily using at bedtime


6. Weak cough: Abdominal binder during the day and as needed. In instructed him 

in bracing his abdomen for coughing/quad cough.





























09/09/19 18:40

## 2019-09-10 RX ADMIN — Medication SCH TAB: at 20:03

## 2019-09-10 RX ADMIN — Medication SCH UNITS: at 09:08

## 2019-09-10 RX ADMIN — Medication SCH CAP: at 20:01

## 2019-09-10 RX ADMIN — ENOXAPARIN SODIUM SCH MG: 40 INJECTION SUBCUTANEOUS at 18:12

## 2019-09-10 RX ADMIN — DOXYCYCLINE HYCLATE SCH MG: 100 CAPSULE ORAL at 18:12

## 2019-09-10 RX ADMIN — DOCUSATE SODIUM SCH MG: 100 CAPSULE, LIQUID FILLED ORAL at 09:08

## 2019-09-10 RX ADMIN — DOCUSATE SODIUM SCH MG: 100 CAPSULE, LIQUID FILLED ORAL at 20:04

## 2019-09-10 RX ADMIN — METHOCARBAMOL PRN MG: 500 TABLET ORAL at 20:04

## 2019-09-10 RX ADMIN — Medication SCH TAB: at 09:08

## 2019-09-10 RX ADMIN — Medication SCH CAP: at 09:08

## 2019-09-10 RX ADMIN — DOXYCYCLINE HYCLATE SCH MG: 100 CAPSULE ORAL at 06:41

## 2019-09-10 NOTE — PN
Progress Note


Date of Service: 09/10/19


Note: 


RANDY CARBONE was visited. Therapy notes read and reviewed. He was discussed 

in interdisciplinary team rounds. He remains with proximal weakness but working 

toward Friday








Current Medications: 


 Active Medications











Generic Name Dose Route Start Last Admin





  Trade Name Freq  PRN Reason Stop Dose Admin


 


Acetaminophen  650 mg  08/27/19 16:25  09/01/19 18:11





  Tylenol Tab*  PO   650 mg





  Q6H PRN   Administration





  MILD PAIN or TEMP > 100.4   





     





     





     


 


Cholecalciferol  1,000 units  08/30/19 09:00  09/10/19 09:08





  Vitamin D Tab*  PO   1,000 units





  DAILY CONSUELO   Administration





     





     





     





     


 


Docusate Sodium  100 mg  08/27/19 21:00  09/10/19 20:04





  Colace Cap*  PO   100 mg





  BID CONSUELO   Administration





     





     





     





     


 


Doxycycline Hyclate  100 mg  09/07/19 07:30  09/10/19 18:12





  Vibramycin Cap(*)  PO  09/21/19 07:29  100 mg





  Q12H CONSUELO   Administration





     





     





     





     


 


Enoxaparin Sodium  40 mg  08/27/19 18:00  09/10/19 18:12





  Lovenox(*)  SUBCUT   40 mg





  Q24H CONSUELO   Administration





     





     





     





     


 


Lactobacillus Rhamnosus  1 tab  09/08/19 21:00  09/10/19 20:03





  Lactobacillus Acidophilus*  PO   1 tab





  BID COSNUELO   Administration





     





     





     





     


 


Magnesium Hydroxide  30 ml  08/27/19 16:25  





  Milk Of Magnesia Liq*  PO   





  Q6H PRN   





  CONSTIPATION   





     





     





     


 


Methocarbamol  750 mg  08/31/19 11:47  09/10/19 20:04





  Robaxin Tab*  PO   750 mg





  Q6H PRN   Administration





  SPASMS - MUSCLE   





     





     





     


 


Multivitamins/Minerals  1 cap  08/27/19 21:00  09/10/19 20:01





  Preservision Areds 2  PO   1 cap





  BID CONSUELO   Administration





     





     





     





     


 


Ondansetron HCl  4 mg  09/08/19 09:54  





  Zofran Odt Tab*  PO   





  Q6H PRN   





  NAUSEA   





     





     





     


 


Senna  2 tab  08/27/19 16:25  





  Senokot 8.6 Mg Tab*  PO   





  BEDTIME PRN   





  CONSTIPATION   





     





     





     











Vital Signs: 


 Vital Signs











Temp Pulse Resp BP Pulse Ox


 


 98 F   91   17   121/60   97 


 


 09/10/19 15:54  09/10/19 15:54  09/10/19 20:04  09/10/19 15:54  09/10/19 15:54











Exam: 





GENERAL: no acute distress. alert and appropriate


LUNGS: Clear to auscultation bilaterally


HEART: Regular rate and rhythm


ABDOMEN: Soft + bowel sounds, non-tender, non-distended


EXTREMITIES: no edema


NEUROLOGIC: Sensation intact x4.  Left motor 4/5 ADM, 3/5 hip flex 3/5 knee ext

; rest 5/5. 


Assessment/Plan: 





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 ID follow up. PT/OT.  


2. Back Pain: Currently pain free


3. DVT Prophylaxis: Lovenox


4. Advance Directives: Full code


5. Muscle spasm: methocarbamol prn which he is primarily using at bedtime


6. Weak cough: Abdominal binder during the day and as needed. In instructed him 

in bracing his abdomen for coughing/quad cough.





























09/10/19 21:26

## 2019-09-10 NOTE — PMRUTEAM
PMRU: Team Meeting


Current Status: 


 Nursing: Current Status











Skin Deviations [none]         Other


 


Skin Deviations [Right Knee]   Abrasion


 


Skin Deviations [Lower         Other





Posterior Back]                


 


Skin Deviation Description [   none





none]                          


 


Skin Deviation Description [   emelia





Right Knee]                    


 


Skin Deviation Description [   nothing visualized





Lower Posterior Back]          


 


Bladder Current Status         voiding in br


 


Bowel Current Status           colace given


 


Nutrition Current Status       excellent


 


Medication Current Status      no pain meds given











 Physical Therapy: Current Status











Bed Mobility Assistance        Supervision


 


Transfer Mobility Assistance   Supervision,Contact Guard Assist


 


Transfer/Bed Mobility          Rolling Walker





Recommended Devices            


 


Ambulation Assistance          Supervision


 


Ambulation Assistive Devices   Rolling Walker


 


Number of Feet Patient         500





Ambulated                      


 


Stairs Assistance              Contact Guard Assist


 


Stairs Recommended Devices     Two Rails


 


Number of Stairs               2x 3


 


Manual Wheelchair Control/     Bilateral LE's





Technique                      


 


Wheelchair Propulsion Ability  Standby Assistance


 


Wheelchair Distance (ft)       180


 


Objective Comments             patient ascends and descends 2 stairs 3x this





 session needing rare cuing for procedure.  patient





 improves overall stability and improves descent





 considerably this session.














 Occupational Therapy: Current Status











Upper Body Dressing            Supervision


 


Lower Body Dressing            Supervision,Contact Guard Assist


 


Lower Body Dressing Progress   close S


 


Bathing                        Supervision


 


Toileting                      Supervision


 


Toileting Progress             close S


 


Toilet Transfer                Supervision


 


Toilet Transfer Progress       close S


 


Shower Transfer                Supervision


 


Shower Transfer Progress       close S


 


Eating                         Independent














 Rec Therapy: Current Status











Summary of Assessment and      Recreation Therapy assessment complete and





Clinical Impression            services have been introduced.  Pt. has been





 engaged in leisure activities, social with





 visitors and during leisure sessions.  Pt. has





 also been engaged in pet therapy.


 


Treatment Goals                Pt. will engage in leisure activities while on 

the





 unit.


 


Treatment Plan                 Provide recreation therapy services and encourage





 involvement.














 Nutrition: Current Status











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





 disease. He has been eating well (100%) w/regular





 diet and is VERY complimentary of the food here.





 Enjoys having some sherbet or ice cream from [facility]





 pantry mid-afternoon.  He confirms that his usual





 wt in usually around 185# and that current





 documented wt 170# "could be accurate", as he had





 been trrying to lose wt and cut back on portion





 sizes pta.  However, he does not think this has





 happened in the past month.  He does not feel as





 if his clothes are fitting more loosely.  He does





 not have any obvious visual s/sx of wasting/





 malnutrition.  He is eating well and appears to be





 meeting est needs.  Labs 9/4 unremarkable.  Meds





 unremarkable.  Skin is intact w/low risk for





 breakdown.  Bowel appears ot be regulated; last BM





 today (9/6).  Will cont regular diet and





 encourage well-balanced po intake.














Goals: 


 Physical Therapy: Initial Goals











Bed Mobility Assistance        Independent


 


Transfer Mobility Assistance   Independent


 


Transfer/Bed Mobility          Rolling Walker





Recommended Devices            


 


Ambulation                     Independent


 


Ambulation Recommended Devices Rolling Walker


 


Ambulation Distance            150


 


Stairs Assistance              Independent


 


Stair Recommended Devices      Two Rails


 


Number of Stairs               10














 Physical Therapy: Updated Goals











Transfer/Bed Mobility          Rolling Walker





Recommended Devices            














 Occupational Therapy: Initial Goals











Goals to be Completed in (Days 7





)                              


 


Upper Body Bathing Routine     Modified Independent with


 


Lower Body Bathing Routine     Modified Independent with


 


Upper Body Dressing Routine    Independent


 


Lower Body Dressing Routine    Modified Independent with


 


Toilet Hygeine and Clothing    Modified Independent with





Management Routine             


 


Toilet Transfer Routine        Modified Independent with


 


Tub Transfer Routine           Modified Independent with


 


Functional Transfers for ADL   Modified Independent with


 


Grooming Routine               Modified Independent with


 


Feeding Routine                Independent














 Nursing: Goals











Bladder Goal                   independent


 


Bowel Goal                     independent


 


Nutrition Goal                 100% of all meals consumed


 


Medication Goal                independent














 Nutrition: Goals











Intervention Goals             1.  adequate intake to support hydration and lean





 body mass without add'l wt gain





 2.  glycemic control within inpatient parameters;





 no s/sx hypo-hyperglycemia





 3.  maintain serum electrolytes WNL





 4.  regulation of bowel pattern; no c/o





 constipation (or diarrhea)





 














Care Plan: 


 Care Plan





ADL's - Improve/Maintain                Start:  08/28/19 02:51              


Freq:   DAILY@0700,1900                 Status: Active      Target: 08/29/19


Protocol:                                                                   








Activity Type Activity Date Activity User E-Sign Co-Sign Detail





Recorded Client Recorded Date Recorded By   


 


Document 09/09/19 15:31 [id number]   





PMRU-C14 09/09/19 15:31 [id number]   














  09/09/19





  15:31


 


PMRU Outcome: ADL's/ADL Transfers 


 


Orders/Interventions Occupational





 Therapy





 Evaluation &





 Treatment





 Communication





 Tool in Patient





 Room


 


Device Yes


 


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





 * Lyme dz?


 


Patient to receive OT 5x/wk for  Therex





min/day Self Care





 Management





 Group Therapy


 


UE/LE ADL's with Assist Yes: Divya


 


ADL Transfers with Assist Yes: Divya


 


Toileting: Transfers,Clothing Management Yes: Divya





,Hygeine w/Assist 


 


Light Kitchen/Laundry w/Assist Yes: Divya


 


Progression Toward Outcome/Goals Not Progressing


 


Lack of Progression Comment Pt continues to





 perform at the





 same





 functional





 level, and





 continues to





 have the same





 level of





 endurance for





 standing





 activities








Discharge Planning - Improve/Maintain   Start:  08/28/19 02:51              


Freq:   DAILY@0700,1900                 Status: Active      Target: 09/13/19


Protocol:                                                                   








Activity Type Activity Date Activity User E-Sign Co-Sign Detail





Recorded Client Recorded Date Recorded By   


 


Document 09/10/19 02:09 [id number]   





PMRU-C03 09/10/19 02:10 [id number]   














  09/10/19





  02:09


 


PMRU Outcome: Discharge Planning 


 


Update Patient Family No


 


Current Discharge Planning Outcome/Goals Demonstrates





 Understanding





 of Discharge





 Plan





 Homecare





 Referral - See





 Comment


 


Progression Toward Outcome/Goals Progressing








Education-Improve/Maintain              Start:  08/27/19 15:08              


Freq:   DAILY@0700,1900                 Status: Active      Target: 09/13/19


Protocol:                                                                   








Activity Type Activity Date Activity User E-Sign Co-Sign Detail





Recorded Client Recorded Date Recorded By   


 


Document 09/10/19 10:55 [id number]   





PMRU-C14 09/10/19 10:56 [id number]   














  09/10/19





  10:55


 


PMRU Outcome: Education 


 


Current Education Outcome/Goals Demonstrate/





 Verbalize





 Understanding





 of Written





 Discharge





 Instructions





 Demonstrates





 Skills





 Encourage





 Questions


 


Progression Toward Outcome/Goals Progressing








/GI-Improve/Maintain                  Start:  08/28/19 02:51              


Freq:   DAILY@0700,1900                 Status: Complete    Target: 09/06/19


Protocol:                                                                   








Activity Type Activity Date Activity User E-Sign Co-Sign Detail





Recorded Client Recorded Date Recorded By   


 


Document 08/28/19 16:43 [id number]   





PMRU-C07 08/28/19 16:47 [id number]   














  08/28/19





  16:43


 


PMRU Outcome: Genitourinary/ 





Gastrointestinal 


 


Current Gastrointestinal Outcome/Goals Maintain/





 Achieve Bowel





 Regularity in





 Accordance with





 Pt's Baseline





 Remain Free of





 Emesis





 Prevent





 Constipation





 Laxatives as





 Ordered


 


Progression Toward Outcome/Goals Goals Met


 


Outcome/Goals Met Remain Free of





 Emesis





 Prevent





 Constipation


 


Current Genitourinary Outcome/Goals Maintain/





 Achieve Urinary





 Continence





 Remain Free of





 Hospital-





 Acquired UTI


 


Progression Toward Outcome/Goals Goals Met


 


Outcome/Goals Met Maintain/





 Achieve Urinary





 Continence





 Remain Free of





 Hospital-





 Acquired UTI








Medication Administration               Start:  08/28/19 02:51              


Freq:   DAILY@0700,1900                 Status: Complete    Target: 09/13/19


Protocol:                                                                   








Activity Type Activity Date Activity User E-Sign Co-Sign Detail





Recorded Client Recorded Date Recorded By   


 


Document 09/05/19 17:13 [id number]   





PMRU-C03 09/05/19 17:14 [id number]   














  09/05/19





  17:13


 


PMRU Outcome: Medication Administration 


 


Assess Patient Knowledge/Teach Med Yes





Education for all Meds 


 


Current Med Admin Outcome/Goals Family/





 Caregiver





 Administer





 Medications at





 Home





 Demonstrates





 Understanding


 


Progression Towards Outcome/Goals Goals Met


 


Outcome/Goals Met Patient





 Independent





 with Medication





 Administration





 at Home


 


Is Patient Going Home on Lovenox? Yes


 


If Patient is Going Home on Lovenox, Who patient





Will Administer 








Mobility- Improve/Maintain              Start:  08/27/19 15:08              


Freq:   DAILY@0700,1900                 Status: Active      Target: 09/18/19


Protocol:                                                                   








Activity Type Activity Date Activity User E-Sign Co-Sign Detail





Recorded Client Recorded Date Recorded By   


 


Document 09/09/19 18:52 [id number]   





PMRU-C08 09/09/19 18:52 [id number]   














  09/09/19





  18:52


 


PMRU Outcome: Mobility 


 


Physical Therapy Evaluation and Yes





Treatment 


 


Activity OOB with Assistance Yes


 


WBAT Yes


 


Device Yes


 


Assistance Yes


 


Patient to be seen 5x/wk for  min/ Therex





day for: Mobility





 Training





 Gait Training





 W/C Mobility





 Balance


 


Current Mobility Outcome/Goals Maintain/





 Achieve





 Baseline





 Mobility Status





 Improve





 Mobility Status





 Demonstrates





 Proper Use of





 Assistive





 Devices





 Free from





 Complications





 of Immobility


 


Progression Toward Outcome/Goals Progressing


 


Bed Mobility Yes:





 independent


 


Transfers Yes:





 independent





 with rolling





 walker.


 


Gait x ft Yes:





 independent





 with rolling





 walker 150'


 


Up/Down Stairs Yes:





 independent up/





 down 10 stairs





 with 2 rails.








Neurological- Improve/Maintain          Start:  08/27/19 15:08              


Freq:   DAILY@0700,1900                 Status: Active      Target: 09/13/19


Protocol:                                                                   








Activity Type Activity Date Activity User E-Sign Co-Sign Detail





Recorded Client Recorded Date Recorded By   


 


Document 09/10/19 10:55 [id number]   





PMRU-C14 09/10/19 10:56 [id number]   














  09/10/19





  10:55


 


PMRU Outcome: Neurological 


 


Weakness/Aphasia Weakness


 


Weakness/Aphasia Comment improving


 


Current Neurological Outcome/Goals Maintain/





 Achieve





 Baseline





 Neurological





 Status





 Improve





 Neurological





 Status





 Prevent





 Avoidable





 Neurological





 Decline





 Demonstrate





 Knowledge of





 Prevention/Tx





 of Neuro





 Disorders/





 Complication





 Maintain/





 Improve





 Strength/ROM


 


Progression Toward Outcome/Goals Progressing








Rec Therapy- Improve/Maintain           Start:  08/27/19 16:54              


Freq:   DAILY@0700,1900                 Status: Active      Target: 09/13/19


Protocol:                                                                   








Activity Type Activity Date Activity User E-Sign Co-Sign Detail





Recorded Client Recorded Date Recorded By   


 


Document 09/05/19 15:57 [id number]   





PMRU-C06 09/05/19 15:57 [id number]   














  09/05/19





  15:57


 


PMRU Outcome: Recreation Therapy 


 


Current Rec Ther Outcome/Goals Meet with





 Patient





 Regularly for





 Support





 Encourage





 Leisure





 Involvement


 


Progression Toward Outcome/Goals Progressing


 


Lack of Progression Comment pt. has been





 social during





 leisure visits,





 engaged in





 recreational





 activities with





 family, and





 engaged in pet





 therapy.


 


Outcome/Goals Met Meet with





 Patient





 Regularly for





 Support





 Encourage





 Leisure





 Involvement








Respiratory - Improve/Maintain          Start:  08/27/19 15:17              


Freq:   DAILY@0700,1900                 Status: Complete    Target: 09/02/19


Protocol:                                                                   








Activity Type Activity Date Activity User E-Sign Co-Sign Detail





Recorded Client Recorded Date Recorded By   


 


Document 08/28/19 16:43 [id number]   





PMRU-C07 08/28/19 16:47 [id number]   














  08/28/19





  16:43


 


PMRU Outcome: Respiratory 


 


Does Patient Have a Trach No


 


Current Respiratory Outcome/Goals Maintain/





 Improve





 Baseline





 Respiratory





 Status





 Maintain/





 Improve





 Activity





 Tolerance





 Prevent





 Pneumonia/





 Atelectasis


 


Progression Toward Outcome/Goals Goals Met


 


Outcome/Goals Met Maintain/





 Improve





 Activity





 Tolerance








Safety- Improve/Maintain                Start:  08/27/19 14:09              


Freq:   DAILY@0700,1900                 Status: Active      Target: 09/13/19


Protocol:                                                                   








Activity Type Activity Date Activity User E-Sign Co-Sign Detail





Recorded Client Recorded Date Recorded By   


 


Document 09/10/19 10:55 [id number]   





PMRU-C14 09/10/19 10:56 [id number]   














  09/10/19





  10:55


 


PMRU Outcome: Safety 


 


Current Safety Outcome/Goals Remain Free of





 Injury or Harm





 Cooperates with





 Safety





 Measures for





 Least





 Restrictive





 Environment





 Prevent Falls/





 Injury


 


Progression Toward Outcome/Goals Progressing














Medicine Note: 








Length of Stay:  3 days





Anticipated Discharge Destination: 





Tentative Discharge Date:  09/13/19





Discharged to:  Home

## 2019-09-11 LAB
ALBUMIN SERPL BCG-MCNC: 3.6 G/DL (ref 3.2–5.2)
ALBUMIN/GLOB SERPL: 1.6 {RATIO} (ref 1–3)
ALP SERPL-CCNC: 67 U/L (ref 34–104)
ALT SERPL W P-5'-P-CCNC: 31 U/L (ref 7–52)
ANION GAP SERPL CALC-SCNC: 3 MMOL/L (ref 2–11)
AST SERPL-CCNC: 20 U/L (ref 13–39)
BASOPHILS # BLD AUTO: 0.1 10^3/UL (ref 0–0.2)
BUN SERPL-MCNC: 14 MG/DL (ref 6–24)
BUN/CREAT SERPL: 15.1 (ref 8–20)
CALCIUM SERPL-MCNC: 8.8 MG/DL (ref 8.6–10.3)
CHLORIDE SERPL-SCNC: 104 MMOL/L (ref 101–111)
EOSINOPHIL # BLD AUTO: 0.1 10^3/UL (ref 0–0.6)
GLOBULIN SER CALC-MCNC: 2.3 G/DL (ref 2–4)
GLUCOSE SERPL-MCNC: 110 MG/DL (ref 70–100)
HCO3 SERPL-SCNC: 30 MMOL/L (ref 22–32)
HCT VFR BLD AUTO: 41 % (ref 42–52)
HGB BLD-MCNC: 14.3 G/DL (ref 14–18)
LYMPHOCYTES # BLD AUTO: 2.3 10^3/UL (ref 1–4.8)
MCH RBC QN AUTO: 32 PG (ref 27–31)
MCHC RBC AUTO-ENTMCNC: 35 G/DL (ref 31–36)
MCV RBC AUTO: 92 FL (ref 80–94)
MONOCYTES # BLD AUTO: 0.7 10^3/UL (ref 0–0.8)
NEUTROPHILS # BLD AUTO: 3.6 10^3/UL (ref 1.5–7.7)
NRBC # BLD AUTO: 0 10^3/UL
NRBC BLD QL AUTO: 0.1
PLATELET # BLD AUTO: 339 10^3/UL (ref 150–450)
POTASSIUM SERPL-SCNC: 4.1 MMOL/L (ref 3.5–5)
PROT SERPL-MCNC: 5.9 G/DL (ref 6.4–8.9)
RBC # BLD AUTO: 4.46 10^6 /UL (ref 4.18–5.48)
SODIUM SERPL-SCNC: 137 MMOL/L (ref 135–145)
WBC # BLD AUTO: 6.6 10^3/UL (ref 3.5–10.8)

## 2019-09-11 RX ADMIN — Medication SCH TAB: at 21:13

## 2019-09-11 RX ADMIN — DOXYCYCLINE HYCLATE SCH MG: 100 CAPSULE ORAL at 18:46

## 2019-09-11 RX ADMIN — DOCUSATE SODIUM SCH MG: 100 CAPSULE, LIQUID FILLED ORAL at 21:13

## 2019-09-11 RX ADMIN — Medication SCH CAP: at 10:18

## 2019-09-11 RX ADMIN — Medication SCH TAB: at 10:17

## 2019-09-11 RX ADMIN — Medication SCH UNITS: at 10:17

## 2019-09-11 RX ADMIN — METHOCARBAMOL PRN MG: 500 TABLET ORAL at 21:13

## 2019-09-11 RX ADMIN — Medication SCH CAP: at 21:13

## 2019-09-11 RX ADMIN — DOCUSATE SODIUM SCH MG: 100 CAPSULE, LIQUID FILLED ORAL at 10:18

## 2019-09-11 RX ADMIN — DOXYCYCLINE HYCLATE SCH MG: 100 CAPSULE ORAL at 05:59

## 2019-09-11 RX ADMIN — ENOXAPARIN SODIUM SCH MG: 40 INJECTION SUBCUTANEOUS at 17:55

## 2019-09-11 NOTE — PN
Progress Note


Date of Service: 09/11/19


Note: 


RANDY CARBONE was visited. Therapy notes read and reviewed. He has no 

complaints. He seems ready to go home Friday








Current Medications: 


 Active Medications











Generic Name Dose Route Start Last Admin





  Trade Name Freq  PRN Reason Stop Dose Admin


 


Acetaminophen  650 mg  08/27/19 16:25  09/01/19 18:11





  Tylenol Tab*  PO   650 mg





  Q6H PRN   Administration





  MILD PAIN or TEMP > 100.4   





     





     





     


 


Cholecalciferol  1,000 units  08/30/19 09:00  09/11/19 10:17





  Vitamin D Tab*  PO   1,000 units





  DAILY CONSUELO   Administration





     





     





     





     


 


Docusate Sodium  100 mg  08/27/19 21:00  09/11/19 21:13





  Colace Cap*  PO   100 mg





  BID CONSUELO   Administration





     





     





     





     


 


Doxycycline Hyclate  100 mg  09/07/19 07:30  09/11/19 18:46





  Vibramycin Cap(*)  PO  09/21/19 07:29  100 mg





  Q12H CONSUELO   Administration





     





     





     





     


 


Enoxaparin Sodium  40 mg  08/27/19 18:00  09/11/19 17:55





  Lovenox(*)  SUBCUT   40 mg





  Q24H CONSUELO   Administration





     





     





     





     


 


Lactobacillus Rhamnosus  1 tab  09/08/19 21:00  09/11/19 21:13





  Lactobacillus Acidophilus*  PO   1 tab





  BID CONSUELO   Administration





     





     





     





     


 


Magnesium Hydroxide  30 ml  08/27/19 16:25  





  Milk Of Magnesia Liq*  PO   





  Q6H PRN   





  CONSTIPATION   





     





     





     


 


Methocarbamol  750 mg  08/31/19 11:47  09/11/19 21:13





  Robaxin Tab*  PO   750 mg





  Q6H PRN   Administration





  SPASMS - MUSCLE   





     





     





     


 


Multivitamins/Minerals  1 cap  08/27/19 21:00  09/11/19 21:13





  Preservision Areds 2  PO   1 cap





  BID CONSUELO   Administration





     





     





     





     


 


Ondansetron HCl  4 mg  09/08/19 09:54  





  Zofran Odt Tab*  PO   





  Q6H PRN   





  NAUSEA   





     





     





     


 


Senna  2 tab  08/27/19 16:25  





  Senokot 8.6 Mg Tab*  PO   





  BEDTIME PRN   





  CONSTIPATION   





     





     





     











Vital Signs: 


 Vital Signs











Temp Pulse Resp BP Pulse Ox


 


 98.5 F   85   18   124/53   99 


 


 09/11/19 17:50  09/11/19 17:50  09/11/19 21:13  09/11/19 17:50  09/11/19 17:50











Lab Results: 


 Laboratory Results - last 24 hr











  09/11/19 09/11/19





  06:19 06:19


 


WBC  6.6 


 


RBC  4.46 


 


Hgb  14.3 


 


Hct  41 L 


 


MCV  92 


 


MCH  32 H 


 


MCHC  35 


 


RDW  16 H 


 


Plt Count  339 


 


MPV  8.1 


 


Neut % (Auto)  54.1 


 


Lymph % (Auto)  34.1 


 


Mono % (Auto)  9.8 


 


Eos % (Auto)  1.2 


 


Baso % (Auto)  0.8 


 


Absolute Neuts (auto)  3.6 


 


Absolute Lymphs (auto)  2.3 


 


Absolute Monos (auto)  0.7 


 


Absolute Eos (auto)  0.1 


 


Absolute Basos (auto)  0.1 


 


Absolute Nucleated RBC  0.0 


 


Nucleated RBC %  0.1 


 


Sodium   137


 


Potassium   4.1


 


Chloride   104


 


Carbon Dioxide   30


 


Anion Gap   3


 


BUN   14


 


Creatinine   0.93


 


Est GFR ( Amer)   95.6


 


Est GFR (Non-Af Amer)   79.0


 


BUN/Creatinine Ratio   15.1


 


Glucose   110 H


 


Calcium   8.8


 


Total Bilirubin   0.50


 


AST   20


 


ALT   31


 


Alkaline Phosphatase   67


 


Total Protein   5.9 L


 


Albumin   3.6


 


Globulin   2.3


 


Albumin/Globulin Ratio   1.6











Exam: 





GENERAL: no acute distress. alert and appropriate


LUNGS: Clear to auscultation bilaterally


HEART: Regular rate and rhythm


ABDOMEN: Soft + bowel sounds, non-tender, non-distended


EXTREMITIES: no edema


NEUROLOGIC: Sensation intact x4.  Left motor 4/5 ADM, 3/5 hip flex 3/5 knee ext

; rest 5/5. 


Assessment/Plan: 





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 ID follow up. PT/OT.  


2. Back Pain: Currently pain free


3. DVT Prophylaxis: Lovenox


4. Advance Directives: Full code


5. Muscle spasm: methocarbamol prn which he is primarily using at bedtime


6. Weak cough: Abdominal binder during the day and as needed. In instructed him 

in bracing his abdomen for coughing/quad cough.



































09/11/19 21:39

## 2019-09-12 RX ADMIN — ENOXAPARIN SODIUM SCH MG: 40 INJECTION SUBCUTANEOUS at 17:58

## 2019-09-12 RX ADMIN — DOCUSATE SODIUM SCH MG: 100 CAPSULE, LIQUID FILLED ORAL at 20:49

## 2019-09-12 RX ADMIN — Medication SCH TAB: at 07:30

## 2019-09-12 RX ADMIN — Medication SCH CAP: at 07:30

## 2019-09-12 RX ADMIN — DOCUSATE SODIUM SCH MG: 100 CAPSULE, LIQUID FILLED ORAL at 07:30

## 2019-09-12 RX ADMIN — DOXYCYCLINE HYCLATE SCH MG: 100 CAPSULE ORAL at 05:51

## 2019-09-12 RX ADMIN — Medication SCH UNITS: at 07:30

## 2019-09-12 RX ADMIN — Medication SCH CAP: at 20:48

## 2019-09-12 RX ADMIN — DOXYCYCLINE HYCLATE SCH MG: 100 CAPSULE ORAL at 19:22

## 2019-09-12 RX ADMIN — Medication SCH TAB: at 20:49

## 2019-09-12 NOTE — CONS
CONSULTATION REPORT:

 

DATE OF CONSULT:  09/12/19

 

LOCATION:  [facility] bed 248.

 

REASON FOR CONSULT:  Left leg numbness.

 

HISTORY OF PRESENT ILLNESS:  Mr. Valdez is a very nice 74-year-old gentleman
, he is right handed, who was recently admitted back on 08/23/19.  At that time
, he was in good health, presented with generalized weakness and had diffuse 
fasciculations and prominent weakness in the distal upper extremity and 
bilateral proximal lower extremities. Subsequently after complete workup, he 
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  His 
indices came back positive for Lyme.  He was seen by Infectious Disease and was 
put on doxycycline IV for 2 weeks and has recently been switched to p.o. 
doxycycline, which he will take for another 2 weeks.  He has been in rehab for 
several days and has been doing well progressing, some of his strength is back 
and he has started to walk some with a walker.  Overall, he has felt well.  He 
notes that when he first presented he had some tingling in his right fingers, 
but that has subsequently resolved and overall was not complaining of any 
sensory symptoms.  He denies any recent falls, any head trauma.  He denies any 
neck pain while he initially had some low back pain that has subsequently 
improved.  He is not having any radicular pain.  He notes no saddle anesthesia, 
no bladder or bowel incontinence.  He feels like the weakness in his left arm 
has improved bilaterally and the numbness has resolved.  Last night at bedtime, 
he went to bed without any sensory symptoms.  This morning when he woke up, he 
states that he felt that his left lower leg was somewhat numb.  He described it 
as feeling numb "inside."  He denies any injury to that leg.  He states that 
the numbness and tingling were circumferential from the knee down.  He reports 
no pain with the symptoms.  He reports no right-sided symptoms.  He reports no 
history of stroke.  He has no heart issues such as AFib.  He has no palpitations
, shortness of breath, dyspnea on exertion.  He has had no vision changes, 
headaches, nausea, vomiting, diarrhea, constipation.  He has otherwise been 
feeling better since his admission to the [facility].  Strength wise, he also feels 
improved.  He denies any other symptoms.

 

PAST MEDICAL/SURGICAL HISTORY:  Significant for some right rotator cuff surgery
, hemorrhoids, and prostate cancer with radiation in the past, currently in 
remission.

 

MEDICATIONS:  His medication at home was just vitamins.

 

Current medications include:

1.  Tylenol.

2.  Calciferol.

3.  Vitamin D.

4.  Colace.

5.  Vibramycin.

6.  Lovenox.

7.  Lactobacillus acidophilus.

8.  Milk of magnesia.

7.  Robaxin 750 mg q.6 hours p.r.n.

8.  Zofran.

9.  Senna p.r.n.

10.  Multivitamin.

 

ALLERGIES:  No known drug allergies.

 

FAMILY HISTORY:  Significant for stroke in his mother at the age of 102 and 
father with polycythemia vera.

 

SOCIAL HISTORY:  No tobacco.  Occasional alcohol, wine with meals.  No illicit 
substance use.  He has 3 daughters.  He is retired.

 

REVIEW OF SYSTEMS:  In 14-organ systems as noted above, otherwise negative.

 

PHYSICAL EXAM:  Vital Signs:  Temperature of 98.0, pulse of 79, respiratory 
rate of 16, O2 saturation 95%, blood pressure 145/81.  He has been 
normotensive.  In general, he is a well-nourished, well-developed gentleman, in 
no acute distress. He is pleasant, well dressed, well groomed.  He is sitting 
in his chair down at the rehab gym.  HEENT:  Normocephalic, atraumatic.  
Sclerae anicteric.  Mucous membranes are moist.  Oropharynx is clear.  Nares 
are patent.  Neck is supple.  No thyromegaly.  No carotid bruits.  No 
meningismus.  Chest:  Clear to auscultation bilaterally.  Cardiovascular:  
Regular rate and rhythm without murmurs.  Abdomen: Nontender and nondistended.  
Extremities:  No clubbing, cyanosis, or edema is appreciated.  Neurologic 
Examination:  He is awake, alert, and oriented x3.  His speech is fluent.  
There is no dysarthria.  Repetition is intact.  Recall of recent and remote 
events is intact.  Vocabulary is intact.  His mood is euthymic; affect, mood 
congruent.  Cranial nerves:  Pupils are equal, round, and reactive to light and 
accommodation.  Extraocular muscles are intact.  There is no nystagmus, no 
ptosis, no diplopia.  Visual fields are full.  Face is symmetric.  Facial 
sensation is intact to light touch and pinprick.  Hearing is intact.  Tongue is 
midline. Palate raises symmetrically.  Motor Exam:  He has good strength on the 
right side of his body.  He had some 5/5 in the right upper and lower 
extremities.  In his left arm proximally, he is 5/5.  He had some mild biceps 
weakness on the left side. He had some interosseous weakness on the left hand.  
Hand  is good.  His left hip flexor was 3/5.  His abductor was 4-/5 on the 
left, adductor 4-/5.  His knee extensor was 3/5.  Knee flexor was 5/5.  
Dorsiflexion and plantarflexion on the left was 5/5.  I did not see any 
fasciculations on examination.  He does have some decreased bulk in his left 
and right legs.  Sensory exam:  On objective testing, I saw no difference in 
the left or right upper or lower extremity with light touch, pinprick, vibration
, or proprioception.  While he describes loss of sensation "inside"; on 
objective testing, he does not appear to have any loss of sensation and either 
a dermatomal or a stocking glove pattern.  He had difficulty with heel- to-shin 
on the left, but normal on the right.  He had finger-to-nose intact 
bilaterally.  There were no resting tremors and no postural or intention 
tremors. His DTRs were 1+ at the biceps bilaterally, 2+ at the triceps 
bilaterally, 2+ at the right patella, 1+ at the left patella, 2+ at the ankles 
bilaterally.  Downgoing Babinski's bilaterally.  Gait:  He was able to stand, 
but he needed his arms to help get up out of the chair.  He walks with a walker 
slightly wide based and slightly circumducts his left leg.

 

DIAGNOSTIC STUDIES/LAB DATA:  Most recently a CBC with diff with a hematocrit 
of 41.  Chemistry most recently on 09/11/19, he had glucose of 110, total 
protein of 5.9.

 

ASSESSMENT AND PLAN:  Mr. Valdez is a 74-year-old gentleman with no 
significant past medical history who presented to the hospital earlier in 
August with onset of weakness and some sensory symptoms and found to have a 
polyradicular neuropathy related to Lyme disease.  He is currently being 
treated with a 4-week course of doxycycline, status post 2 weeks of IV and now 
on oral.  In the PMRU, he has been recovering well with return of his strength, 
although he remains weaker on the left side.  Today, I was consulted because he 
presented with acute onset of left lower leg numbness and tingling in a 
stocking distribution to the knee.  This was subjective in nature; on objective 
testing, I saw no difference in the right and left.  From a motor standpoint, 
he seems to be improving and he feels that he is improving.  At this point 
given the fact that there are no real objective symptoms, I am inclined to 
watch this.  I would like to get an MRI of the brain just to make sure he has 
not had a small stroke which could cause some sensory symptoms that are 
unilateral in nature.  This did not sound like a radiculopathy.  Given the 
presentation, he has had no pain.  His back pain has actually improved since he 
has been in physical therapy.  This may be the sequelae of his ongoing Lyme 
infection and polyradicular neuropathy, but it is unclear to me.  His reflexes 
are preserved, and again, strength is improving.  Plan will be to get an MRI to 
rule out any stroke.  Assuming that this is negative, then I think it is okay 
for discharge.  We are going to make a followup soon in the next 10 to 14 days.
  He should return to the ER with any worsening symptoms.  I will sign off for 
now assuming his MRI is negative.  Plan as above.  If the MRI shows any 
evidence of new stroke, please call us.  I would recommend at that point that 
we proceed with a stroke workup including CT angiogram of the head and neck and 
echocardiogram.

 

Thank you for the opportunity to participate in the care of this very 
interesting patient

 

 [id number]/CPS #: [id number]

JYOTHI

## 2019-09-12 NOTE — PN
Progress Note


Date of Service: 09/12/19


Note: 


RANDY CARBONE was visited. Therapy notes read and reviewed. He had increased 

numbness in his left leg today. Seen by Neurology and MRI of brain ordered. If 

negative-will allow discharge tomorrow. 








Current Medications: 


 Active Medications











Generic Name Dose Route Start Last Admin





  Trade Name Freq  PRN Reason Stop Dose Admin


 


Acetaminophen  650 mg  08/27/19 16:25  09/01/19 18:11





  Tylenol Tab*  PO   650 mg





  Q6H PRN   Administration





  MILD PAIN or TEMP > 100.4   





     





     





     


 


Cholecalciferol  1,000 units  08/30/19 09:00  09/12/19 07:30





  Vitamin D Tab*  PO   1,000 units





  DAILY CONSUELO   Administration





     





     





     





     


 


Docusate Sodium  100 mg  08/27/19 21:00  09/12/19 07:30





  Colace Cap*  PO   100 mg





  BID CONSUELO   Administration





     





     





     





     


 


Doxycycline Hyclate  100 mg  09/07/19 07:30  09/12/19 05:51





  Vibramycin Cap(*)  PO  09/21/19 07:29  100 mg





  Q12H CONSUELO   Administration





     





     





     





     


 


Enoxaparin Sodium  40 mg  08/27/19 18:00  09/11/19 17:55





  Lovenox(*)  SUBCUT   40 mg





  Q24H CONSUELO   Administration





     





     





     





     


 


Lactobacillus Rhamnosus  1 tab  09/08/19 21:00  09/12/19 07:30





  Lactobacillus Acidophilus*  PO   1 tab





  BID CONSUELO   Administration





     





     





     





     


 


Magnesium Hydroxide  30 ml  08/27/19 16:25  





  Milk Of Magnesia Liq*  PO   





  Q6H PRN   





  CONSTIPATION   





     





     





     


 


Methocarbamol  750 mg  08/31/19 11:47  09/11/19 21:13





  Robaxin Tab*  PO   750 mg





  Q6H PRN   Administration





  SPASMS - MUSCLE   





     





     





     


 


Multivitamins/Minerals  1 cap  08/27/19 21:00  09/12/19 07:30





  Preservision Areds 2  PO   1 cap





  BID CONSUELO   Administration





     





     





     





     


 


Ondansetron HCl  4 mg  09/08/19 09:54  





  Zofran Odt Tab*  PO   





  Q6H PRN   





  NAUSEA   





     





     





     


 


Senna  2 tab  08/27/19 16:25  





  Senokot 8.6 Mg Tab*  PO   





  BEDTIME PRN   





  CONSTIPATION   





     





     





     











Vital Signs: 


 Vital Signs











Temp Pulse Resp BP Pulse Ox


 


 98.1 F   86   16   141/65   100 


 


 09/12/19 16:26  09/12/19 16:26  09/12/19 16:26  09/12/19 16:26  09/12/19 16:26











Exam: 





GENERAL: no acute distress. alert and appropriate


LUNGS: Clear to auscultation bilaterally


HEART: Regular rate and rhythm


ABDOMEN: Soft + bowel sounds, non-tender, non-distended


EXTREMITIES: no edema


NEUROLOGIC: Sensation intact x4.  Left motor 4/5 ADM, 3/5 hip flex 3/5 knee ext

; rest 5/5. 


Assessment/Plan: 





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 ID follow up. PT/OT. 


2. Back Pain: Currently pain free


3. DVT Prophylaxis: Lovenox


4. Advance Directives: Full code


5. Muscle spasm: methocarbamol prn which he is primarily using at bedtime


6. Weak cough: Abdominal binder during the day and as needed. In instructed him 

in bracing his abdomen for coughing/quad cough.


7. Left leg numbness: MRI of brain



































09/12/19 17:12

## 2019-09-13 VITALS — SYSTOLIC BLOOD PRESSURE: 148 MMHG | DIASTOLIC BLOOD PRESSURE: 68 MMHG

## 2019-09-13 RX ADMIN — DOCUSATE SODIUM SCH MG: 100 CAPSULE, LIQUID FILLED ORAL at 07:27

## 2019-09-13 RX ADMIN — ACETAMINOPHEN PRN MG: 325 TABLET ORAL at 07:27

## 2019-09-13 RX ADMIN — DOXYCYCLINE HYCLATE SCH MG: 100 CAPSULE ORAL at 06:13

## 2019-09-13 RX ADMIN — Medication SCH CAP: at 07:28

## 2019-09-13 RX ADMIN — Medication SCH UNITS: at 07:27

## 2019-09-13 RX ADMIN — Medication SCH TAB: at 07:28

## 2019-09-13 NOTE — DS
CC:  Mikael Aly; Dr. Block; Dr. Ríos

 

REHABILITATION DISCHARGE SUMMARY:

 

DATE OF ADMISSION:  08/27/19

 

DATE OF DISCHARGE:  09/13/19

 

PRIMARY CARE PROVIDER:  Mikael Aly.

 

NEUROLOGY:  Dr. Block.

 

INFECTIOUS DISEASE:  Dr. Ríos.

 

REASON FOR ADMISSION:  Lower extremity weakness secondary to Lyme 
polyradiculoneuropathy.

 

HISTORY OF PRESENT ILLNESS:  For full details of his acute admission and 
hospital course, please see the note dictated by Dr. Irene on 08/27/19.

 

REHABILITATION COURSE:  During his time on the PMRU, he completed 14 days of IV 
ceftriaxone, and then in followup with Infectious Disease, was transitioned to 
doxycycline 100 mg q.12 hours.  He is to have a total of 14-day course and was 
given a prescription to complete that.  He will follow up with Infectious 
Disease. The day prior to discharge, he reported some new numbness that he felt 
on the inside of his left leg and some on the right.  He was seen by Neurology, 
who did not feel that there was anything new going on but requested MRI of the 
brain.  MRI of the brain was completed on 09/12/19 and did not show any acute 
findings.  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 
imaging.  It was felt that he was safe for discharge.  He will have followup 
with Neurology.  He has been having spasms at night in his left leg that was 
treated with methocarbamol with good results.  He can wean off this as he 
tolerates.  During his time on the PMRU, he participated well with physical 
therapy, and at the time of discharge, is independent with bed mobility, 
transferring with a rolling walker, ambulation with a rolling walking up to 
1000 feet, and climbing stairs with 2 rails.  He also participated well with 
occupational therapy, and at the time of discharge, is independent with eating.
  For his first shower, he should be supervised using a tub transfer bench.  He 
is independent dressing but should be sitting for all tasks and using his 
walker if he is standing.  He is independent with toileting and toilet 
transfers using his walker.  He is independent for small tasks within the 
kitchen using his walker.

 

DISCHARGE CONDITION:  Fair.

 

DISCHARGE DISPOSITION:  Home with family support.

 

DISCHARGE MEDICATIONS:

1.  Vitamin D 1000 units daily.

2.  Docusate 100 mg b.i.d.

3.  Doxycycline 100 mg q.12 hours.

4.  Methocarbamol 750 mg q.6 hours p.r.n. muscle spasm.

 

FOLLOWUP:

1.  He is to follow up with his primary care provider Dr. Brito in 1 to 2 
weeks.

2.  Follow up with Neurology in 1 to 2 weeks.

3.  Follow up with Dr. Ríos in 1 to 2 weeks.

4.  [facility] nursing, PT/OT and 
home health aide

 

DISCHARGE DIAGNOSES:

1.  Lyme disease with polyradiculoneuropathy.

2.  Muscle spasm.

3.  Arthritis.

4.  History of prostate cancer.

 

 [id number]/CPS #: [id number]

[facility]

## 2019-09-13 NOTE — PN
Progress Note


Date of Service: 09/13/19


Note: 


RANDY CARBONE was visited. Nursing and therapy notes read and reviewed. No 

chest pain, shortness of breath or abdominal pain.  He has the same "inside 

numbness" in the left leg as yesterday. His ability to cough has gotten 

stronger since I last saw him. He is eager to go home later today.








Current Medications: 


 Active Medications











Generic Name Dose Route Start Last Admin





  Trade Name Freq  PRN Reason Stop Dose Admin


 


Acetaminophen  650 mg  08/27/19 16:25  09/13/19 07:27





  Tylenol Tab*  PO   650 mg





  Q6H PRN   Administration





  MILD PAIN or TEMP > 100.4   





     





     





     


 


Cholecalciferol  1,000 units  08/30/19 09:00  09/13/19 07:27





  Vitamin D Tab*  PO   1,000 units





  DAILY CONSUELO   Administration





     





     





     





     


 


Docusate Sodium  100 mg  08/27/19 21:00  09/13/19 07:27





  Colace Cap*  PO   100 mg





  BID CONSUELO   Administration





     





     





     





     


 


Doxycycline Hyclate  100 mg  09/07/19 07:30  09/13/19 06:13





  Vibramycin Cap(*)  PO  09/21/19 07:29  100 mg





  Q12H CONSUELO   Administration





     





     





     





     


 


Enoxaparin Sodium  40 mg  08/27/19 18:00  09/12/19 17:58





  Lovenox(*)  SUBCUT   40 mg





  Q24H CONSUELO   Administration





     





     





     





     


 


Lactobacillus Rhamnosus  1 tab  09/08/19 21:00  09/13/19 07:28





  Lactobacillus Acidophilus*  PO   1 tab





  BID CONSUELO   Administration





     





     





     





     


 


Magnesium Hydroxide  30 ml  08/27/19 16:25  





  Milk Of Magnesia Liq*  PO   





  Q6H PRN   





  CONSTIPATION   





     





     





     


 


Methocarbamol  750 mg  08/31/19 11:47  09/11/19 21:13





  Robaxin Tab*  PO   750 mg





  Q6H PRN   Administration





  SPASMS - MUSCLE   





     





     





     


 


Multivitamins/Minerals  1 cap  08/27/19 21:00  09/13/19 07:28





  Preservision Areds 2  PO   1 cap





  BID CONSUELO   Administration





     





     





     





     


 


Ondansetron HCl  4 mg  09/08/19 09:54  





  Zofran Odt Tab*  PO   





  Q6H PRN   





  NAUSEA   





     





     





     


 


Senna  2 tab  08/27/19 16:25  





  Senokot 8.6 Mg Tab*  PO   





  BEDTIME PRN   





  CONSTIPATION   





     





     





     











Vital Signs: 


 Vital Signs











Temp Pulse Resp BP Pulse Ox


 


 98.2 F   76   16   148/68   95 


 


 09/13/19 05:17 09/13/19 05:17  09/13/19 05:17 09/13/19 05:17 09/13/19 07:33











Exam: 





GENERAL: no acute distress. alert and appropriate


LUNGS: Clear to auscultation bilaterally


HEART: Regular rate and rhythm


ABDOMEN: Soft + bowel sounds, non-tender, non-distended


EXTREMITIES: no edema


NEUROLOGIC: Sensation intact to light touch x4.  Left motor 4/5 ADM, 2/5 hip 

flex 3/5 knee ext; rest 5/5. 





MRI brain 9/12/19 showed no acute findings. He has a sphenoid muscous 

rentention cyst as seen on prior image.








Assessment/Plan: 





1. Lyme Polyradicular neuritis: doxycycline 100 BID day 5/14. ID and neurology 

follow up. PT/OT. 


2. Back Pain: Currently pain free


3. DVT Prophylaxis: Lovenox


4. Advance Directives: Full code


5. Muscle spasm: methocarbamol prn which he is primarily using at bedtime


6. Weak cough: Abdominal binder during the day and as needed. 


7. Dispo: home today.





09/13/19 11:56

## 2022-06-17 NOTE — PMRUTEAM
PMRU: Team Meeting


Current Status: 


 Nursing: Current Status











Skin Deviations [Lower         Other





Posterior Back]                











 Physical Therapy: Current Status











Bed Mobility Assistance        Supervision


 


Transfer Mobility Assistance   Contact Guard Assist


 


Transfer/Bed Mobility          Rolling Walker





Recommended Devices            


 


Ambulation Assistance          Contact Guard Assist


 


Ambulation Assistive Devices   Rolling Walker


 


Number of Feet Patient         100





Ambulated                      


 


Stairs Assistance              Not Tested


 


Number of Stairs               12


 


Manual Wheelchair Control/     Bilateral LE's





Technique                      


 


Wheelchair Propulsion Ability  Standby Assistance


 


Wheelchair Distance (ft)       120


 


Objective Comments             patient works on propelling W/C with BLE works on





 attaining end range knee extension in LLE with





 every step.














 Occupational Therapy: Current Status











Upper Body Dressing            Supervision


 


Lower Body Dressing            Min Assist


 


Bathing                        Contact Guard Assist


 


Toileting                      Contact Guard Assist


 


Toilet Transfer                Contact Guard Assist


 


Shower Transfer                Contact Guard Assist


 


Eating                         Supervision














 Rec Therapy: Current Status











Summary of Assessment and      Pt. was open to conversation - very engaged and





Clinical Impression            talkative throughout.  Pt. was pleasant,





 cooperative, and identified with numerous





 interests.  Pt. states he was very active prior to





 admission and values leisure very much.  Pt.





 participated in pet therapy but it is important to





 note that this program should not take place if





 his wife is in the room as she has canine phobia.


 


Treatment Goals                Pt. will engage in leisure activities while on 

the





 unit.


 


Treatment Plan                 Provide recreation therapy services and encourage





 involvement.

















Goals: 


 Physical Therapy: Initial Goals











Bed Mobility Assistance        Independent


 


Transfer Mobility Assistance   Independent


 


Transfer/Bed Mobility          Rolling Walker





Recommended Devices            


 


Ambulation                     Independent


 


Ambulation Recommended Devices Rolling Walker


 


Ambulation Distance            150


 


Stairs Assistance              Independent


 


Stair Recommended Devices      Two Rails


 


Number of Stairs               10














 Occupational Therapy: Initial Goals











Goals to be Completed in (Days 7





)                              


 


Upper Body Bathing Routine     Modified Independent with


 


Lower Body Bathing Routine     Modified Independent with


 


Upper Body Dressing Routine    Independent


 


Lower Body Dressing Routine    Modified Independent with


 


Toilet Hygeine and Clothing    Modified Independent with





Management Routine             


 


Toilet Transfer Routine        Modified Independent with


 


Tub Transfer Routine           Modified Independent with


 


Functional Transfers for ADL   Modified Independent with


 


Grooming Routine               Modified Independent with


 


Feeding Routine                Independent

















Care Plan: 


 Care Plan





ADL's - Improve/Maintain                Start:  08/28/19 02:51              


Freq:   DAILY@0700,1900                 Status: Active      Target: 08/29/19


Protocol:                                                                   








Activity Type Activity Date Activity User E-Sign Co-Sign Detail





Recorded Client Recorded Date Recorded By   


 


Document 08/29/19 15:53 [id number]   





[id number]-C09 08/29/19 15:53 [id number]   














  08/29/19





  15:53


 


PMRU Outcome: ADL's/ADL Transfers 


 


Orders/Interventions Occupational





 Therapy





 Evaluation &





 Treatment





 Communication





 Tool in Patient





 Room


 


Device Yes


 


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





 * Lyme dz?


 


Patient to receive OT 5x/wk for  Therex





min/day Self Care





 Management





 Group Therapy


 


UE/LE ADL's with Assist Yes: Divya


 


ADL Transfers with Assist Yes: Divya


 


Toileting: Transfers,Clothing Management Yes: Divya





,Hygeine w/Assist 


 


Light Kitchen/Laundry w/Assist Yes: Divya


 


Other Outcome/Goals Pt is





 participatory





 in therapy and





 shows





 improvement in





 edurance: pt





 was able to





 walk to shower





 room and back





 without





 requiring w/c.





 Pt also reports





 no longer





 requiring





 assistance with





 peeling back





 lids and





 opening





 packaging





 during meals.


 


Progression Toward Outcome/Goals Progressing








Discharge Planning - Improve/Maintain   Start:  08/28/19 02:51              


Freq:   DAILY@0700,1900                 Status: Active      Target: 09/04/19


Protocol:                                                                   








Activity Type Activity Date Activity User E-Sign Co-Sign Detail





Recorded Client Recorded Date Recorded By   


 


Document 08/29/19 16:32 [id number]   





PMRU-C07 08/29/19 16:32 [id number]   














  08/29/19





  16:32


 


PMRU Outcome: Discharge Planning 


 


Update Patient Family No


 


Current Discharge Planning Outcome/Goals Demonstrates





 Understanding





 of Discharge





 Plan





 Homecare





 Referral - See





 Comment


 


Progression Toward Outcome/Goals Progressing








Education-Improve/Maintain              Start:  08/27/19 15:08              


Freq:   DAILY@0700,1900                 Status: Active      Target: 09/04/19


Protocol:                                                                   








Activity Type Activity Date Activity User E-Sign Co-Sign Detail





Recorded Client Recorded Date Recorded By   


 


Document 08/29/19 16:32 [id number]   





PMRU-C07 08/29/19 16:32 [id number]   














  08/29/19





  16:32


 


PMRU Outcome: Education 


 


Current Education Outcome/Goals Demonstrate/





 Verbalize





 Understanding





 of Written





 Discharge





 Instructions





 Demonstrates





 Skills





 Encourage





 Questions


 


Progression Toward Outcome/Goals Progressing








/GI-Improve/Maintain                  Start:  08/28/19 02:51              


Freq:   DAILY@0700,1900                 Status: Complete    Target: 09/04/19


Protocol:                                                                   








Activity Type Activity Date Activity User E-Sign Co-Sign Detail





Recorded Client Recorded Date Recorded By   


 


Document 08/28/19 16:43 [id number]   





PMRU-C07 08/28/19 16:47 [id number]   














  08/28/19





  16:43


 


PMRU Outcome: Genitourinary/ 





Gastrointestinal 


 


Current Gastrointestinal Outcome/Goals Maintain/





 Achieve Bowel





 Regularity in





 Accordance with





 Pt's Baseline





 Remain Free of





 Emesis





 Prevent





 Constipation





 Laxatives as





 Ordered


 


Progression Toward Outcome/Goals Goals Met


 


Outcome/Goals Met Remain Free of





 Emesis





 Prevent





 Constipation


 


Current Genitourinary Outcome/Goals Maintain/





 Achieve Urinary





 Continence





 Remain Free of





 Hospital-





 Acquired UTI


 


Progression Toward Outcome/Goals Goals Met


 


Outcome/Goals Met Maintain/





 Achieve Urinary





 Continence





 Remain Free of





 Hospital-





 Acquired UTI








Medication Administration               Start:  08/28/19 02:51              


Freq:   DAILY@0700,1900                 Status: Active      Target: 09/04/19


Protocol:                                                                   








Activity Type Activity Date Activity User E-Sign Co-Sign Detail





Recorded Client Recorded Date Recorded By   


 


Document 08/29/19 16:32 [id number]   





[id number]-C07 08/29/19 16:32 [id number]   














  08/29/19





  16:32


 


PMRU Outcome: Medication Administration 


 


Assess Patient Knowledge/Teach Med Yes





Education for all Meds 


 


Current Med Admin Outcome/Goals Patient





 Independent





 with Medication





 Administration





 at Home





 Demonstrates





 Understanding


 


Progression Towards Outcome/Goals Progressing


 


Is Patient Going Home on Lovenox? No








Mobility- Improve/Maintain              Start:  08/27/19 15:08              


Freq:   DAILY@0700,1900                 Status: Active      Target: 09/18/19


Protocol:                                                                   








Activity Type Activity Date Activity User E-Sign Co-Sign Detail





Recorded Client Recorded Date Recorded By   


 


Document 08/29/19 16:56 [id number]   





SSU-C14 08/29/19 16:56 [id number]   














  08/29/19





  16:56


 


PMRU Outcome: Mobility 


 


Physical Therapy Evaluation and Yes





Treatment 


 


Activity OOB with Assistance Yes


 


WBAT Yes


 


Device Yes


 


Assistance Yes


 


Patient to be seen 5x/wk for  min/ Therex





day for: Mobility





 Training





 Gait Training





 W/C Mobility





 Balance


 


Current Mobility Outcome/Goals Maintain/





 Achieve





 Baseline





 Mobility Status





 Improve





 Mobility Status





 Demonstrates





 Proper Use of





 Assistive





 Devices





 Free from





 Complications





 of Immobility


 


Progression Toward Outcome/Goals Progressing


 


Bed Mobility Yes:





 independent


 


Transfers Yes:





 independent





 with rolling





 walker.


 


Gait x ft Yes:





 independent





 with rolling





 walker 150'


 


Up/Down Stairs Yes:





 independent up/





 down 10 stairs





 with 2 rails.








Neurological- Improve/Maintain          Start:  08/27/19 15:08              


Freq:   DAILY@0700,1900                 Status: Active      Target: 09/02/19


Protocol:                                                                   








Activity Type Activity Date Activity User E-Sign Co-Sign Detail





Recorded Client Recorded Date Recorded By   


 


Document 08/29/19 16:32 [id number]   





PMRU-C07 08/29/19 16:32 [id number]   














  08/29/19





  16:32


 


PMRU Outcome: Neurological 


 


Weakness/Aphasia Weakness


 


Current Neurological Outcome/Goals Maintain/





 Achieve





 Baseline





 Neurological





 Status





 Improve





 Neurological





 Status





 Prevent





 Avoidable





 Neurological





 Decline





 Demonstrate





 Knowledge of





 Prevention/Tx





 of Neuro





 Disorders/





 Complication





 Maintain/





 Improve





 Strength/ROM


 


Progression Toward Outcome/Goals Progressing








Rec Therapy- Improve/Maintain           Start:  08/27/19 16:54              


Freq:   DAILY@0700,1900                 Status: Active      Target: 09/03/19


Protocol:                                                                   








Activity Type Activity Date Activity User E-Sign Co-Sign Detail





Recorded Client Recorded Date Recorded By   


 


Document 08/28/19 16:43 [id number]   





BSU-C04 08/28/19 16:44 [id number]   














  08/28/19





  16:43


 


PMRU Outcome: Recreation Therapy 


 


Current Rec Ther Outcome/Goals Meet with





 Patient





 Regularly for





 Support





 Encourage





 Leisure





 Involvement


 


Progression Toward Outcome/Goals Progressing


 


Lack of Progression Comment pt. continues





 to welcome





 leisure visits,





 has been





 engaged in





 leisure





 activities





 independently





 as well.








Respiratory - Improve/Maintain          Start:  08/27/19 15:17              


Freq:   DAILY@0700,1900                 Status: Complete    Target: 09/02/19


Protocol:                                                                   








Activity Type Activity Date Activity User E-Sign Co-Sign Detail





Recorded Client Recorded Date Recorded By   


 


Document 08/28/19 16:43 [id number]   





PMRU-C07 08/28/19 16:47 [id number]   














  08/28/19





  16:43


 


PMRU Outcome: Respiratory 


 


Does Patient Have a Trach No


 


Current Respiratory Outcome/Goals Maintain/





 Improve





 Baseline





 Respiratory





 Status





 Maintain/





 Improve





 Activity





 Tolerance





 Prevent





 Pneumonia/





 Atelectasis


 


Progression Toward Outcome/Goals Goals Met


 


Outcome/Goals Met Maintain/





 Improve





 Activity





 Tolerance








Safety- Improve/Maintain                Start:  08/27/19 14:09              


Freq:   DAILY@0700,1900                 Status: Active      Target: 09/04/19


Protocol:                                                                   








Activity Type Activity Date Activity User E-Sign Co-Sign Detail





Recorded Client Recorded Date Recorded By   


 


Document 08/29/19 16:32 [id number]   





PMRU-C07 08/29/19 16:32 [id number]   














  08/29/19





  16:32


 


PMRU Outcome: Safety 


 


Current Safety Outcome/Goals Remain Free of





 Injury or Harm





 Cooperates with





 Safety





 Measures for





 Least





 Restrictive





 Environment





 Prevent Falls/





 Injury


 


Other Safety Outcome/Goals patient using





 call bell





 appropriately.





 weakness





 continues


 


Progression Toward Outcome/Goals Progressing














Medicine Note: 








Length of Stay:  [2 weeks]





Anticipated Discharge Destination: home





Tentative Discharge Date:  [9/13/19]





Discharged to:  [home] Unable to contact Cheryl Gonzalez, per pt Cheryl Gonzalez lives in [facility] and would like close to Cheryl Gonzalez. Tentative referral to [facility], await call back from Cheryl Gonzalez.  3:45pm received call from Cheryl Gonzalez, she is agreeable for [facility], [facility] accepted. Envelope, ambulance form in soft chart. PASSR completed and copy in soft chart. Need precert, precert started 5/94. Harriet Montana, MSW, LSW